# Patient Record
Sex: FEMALE | Race: WHITE | Employment: FULL TIME | URBAN - METROPOLITAN AREA
[De-identification: names, ages, dates, MRNs, and addresses within clinical notes are randomized per-mention and may not be internally consistent; named-entity substitution may affect disease eponyms.]

---

## 2017-03-10 ENCOUNTER — TRANSFERRED RECORDS (OUTPATIENT)
Dept: HEALTH INFORMATION MANAGEMENT | Facility: CLINIC | Age: 27
End: 2017-03-10

## 2017-08-22 ENCOUNTER — TRANSFERRED RECORDS (OUTPATIENT)
Dept: HEALTH INFORMATION MANAGEMENT | Facility: CLINIC | Age: 27
End: 2017-08-22

## 2017-09-21 ENCOUNTER — PRENATAL OFFICE VISIT (OUTPATIENT)
Dept: FAMILY MEDICINE | Facility: OTHER | Age: 27
End: 2017-09-21
Payer: COMMERCIAL

## 2017-09-21 ENCOUNTER — ALLIED HEALTH/NURSE VISIT (OUTPATIENT)
Dept: FAMILY MEDICINE | Facility: OTHER | Age: 27
End: 2017-09-21
Payer: COMMERCIAL

## 2017-09-21 VITALS — WEIGHT: 193.1 LBS | SYSTOLIC BLOOD PRESSURE: 110 MMHG | DIASTOLIC BLOOD PRESSURE: 70 MMHG | HEART RATE: 82 BPM

## 2017-09-21 VITALS — WEIGHT: 193 LBS | HEIGHT: 65 IN | BODY MASS INDEX: 32.15 KG/M2

## 2017-09-21 DIAGNOSIS — N91.2 ABSENCE OF MENSTRUATION: Primary | ICD-10-CM

## 2017-09-21 DIAGNOSIS — Z34.00 PREGNANCY, FIRST: Primary | ICD-10-CM

## 2017-09-21 LAB — BETA HCG QUAL IFA URINE: POSITIVE

## 2017-09-21 PROCEDURE — 84703 CHORIONIC GONADOTROPIN ASSAY: CPT | Performed by: FAMILY MEDICINE

## 2017-09-21 PROCEDURE — 99207 ZZC NO CHARGE NURSE ONLY: CPT

## 2017-09-21 RX ORDER — PRENATAL VIT/IRON FUM/FOLIC AC 27MG-0.8MG
1 TABLET ORAL DAILY
COMMUNITY

## 2017-09-21 NOTE — MR AVS SNAPSHOT
"              After Visit Summary   9/21/2017    Ann Cortes    MRN: [id number]           Patient Information     Date Of Birth          1990        Visit Information        Provider Department      9/21/2017 9:30 AM NL OB INTAKE [facility]        Today's Diagnoses     Pregnancy, first    -  1       Follow-ups after your visit        Your next 10 appointments already scheduled     Oct 02, 2017  4:10 PM CDT   New Prenatal with Dereck Conrad MD   [facility]    [address] [place] [id number] [phone]              Who to contact     If you have questions or need follow up information about today's clinic visit or your schedule please contact [facility] directly at [phone].  Normal or non-critical lab and imaging results will be communicated to you by MyChart, letter or phone within 4 business days after the clinic has received the results. If you do not hear from us within 7 days, please contact the clinic through MyChart or phone. If you have a critical or abnormal lab result, we will notify you by phone as soon as possible.  Submit refill requests through [email or web address] or call your pharmacy and they will forward the refill request to us. Please allow 3 business days for your refill to be completed.          Additional Information About Your Visit        Clarihart Information     [email or web address] lets you send messages to your doctor, view your test results, renew your prescriptions, schedule appointments and more. To sign up, go to [email or web address] . Click on \"Log in\" on the left side of the screen, which will take you to the Welcome page. Then click on \"Sign up Now\" on the right side of the page.     You will be asked to enter the access code listed below, as well as some personal information. Please follow the directions to create your username and password.     Your access code is: [email or web address]  Expires: " "2017  9:12 AM     Your access code will  in 90 days. If you need help or a new code, please call your [place] clinic or [phone].        Care EveryWhere ID     This is your Care EveryWhere ID. This could be used by other organizations to access your [place] medical records  [id number]        Your Vitals Were     Height Last Period BMI (Body Mass Index)             5' 5\" (1.651 m) 02/10/2017 (Exact Date) 32.12 kg/m2          Blood Pressure from Last 3 Encounters:   17 110/70    Weight from Last 3 Encounters:   17 193 lb (87.5 kg)   17 193 lb 1.6 oz (87.6 kg)              Today, you had the following     No orders found for display       Primary Care Provider    None Specified       No primary provider on file.        Equal Access to Services     JUNIOR RICCI : Hadii gio Vance, waangel reid, ramiro kaalmalinh farrar, burt rich . So [facility] [phone].    ATENCIÓN: Si habla español, tiene a manuel disposición servicios gratuitos de asistencia lingüística. Llame al [phone].    We comply with applicable federal civil rights laws and [place] laws. We do not discriminate on the basis of race, color, national origin, age, disability sex, sexual orientation or gender identity.            Thank you!     Thank you for choosing [facility]  for your care. Our goal is always to provide you with excellent care. Hearing back from our patients is one way we can continue to improve our services. Please take a few minutes to complete the written survey that you may receive in the mail after your visit with us. Thank you!             Your Updated Medication List - Protect others around you: Learn how to safely use, store and throw away your medicines at www.disposemymeds.org.          This list is accurate as of: 17 10:27 AM.  Always use your most recent med list.                   Brand Name Dispense Instructions for use " Diagnosis    NEXIUM PO      Take 20 mg by mouth every morning (before breakfast)        prenatal multivitamin plus iron 27-0.8 MG Tabs per tablet      Take 1 tablet by mouth daily

## 2017-09-21 NOTE — MR AVS SNAPSHOT
"              After Visit Summary   9/21/2017    Ann Cortes    MRN: [id number]           Patient Information     Date Of Birth          1990        Visit Information        Provider Department      9/21/2017 9:00 AM NL RN [facility]        Today's Diagnoses     Absence of menstruation    -  1       Follow-ups after your visit        Your next 10 appointments already scheduled     Sep 21, 2017  9:30 AM CDT   New Prenatal with NL OB INTAKE   [facility] ([facility])    [address] [facility] [id number] [phone]              Who to contact     If you have questions or need follow up information about today's clinic visit or your schedule please contact [facility] directly at [phone].  Normal or non-critical lab and imaging results will be communicated to you by Vatlerhart, letter or phone within 4 business days after the clinic has received the results. If you do not hear from us within 7 days, please contact the clinic through Vatlerhart or phone. If you have a critical or abnormal lab result, we will notify you by phone as soon as possible.  Submit refill requests through Heetch or call your pharmacy and they will forward the refill request to us. Please allow 3 business days for your refill to be completed.          Additional Information About Your Visit        Vatlerhart Information     Heetch lets you send messages to your doctor, view your test results, renew your prescriptions, schedule appointments and more. To sign up, go to [email or web address] . Click on \"Log in\" on the left side of the screen, which will take you to the Welcome page. Then click on \"Sign up Now\" on the right side of the page.     You will be asked to enter the access code listed below, as well as some personal information. Please follow the directions to create your username and password.     Your access code is: [email or web address]  Expires: 12/20/2017  9:12 AM   "   Your access code will  in 90 days. If you need help or a new code, please call your [place] clinic or [phone].        Care EveryWhere ID     This is your Care EveryWhere ID. This could be used by other organizations to access your [place] medical records  [id number]        Your Vitals Were     Pulse Last Period Breastfeeding?             82 02/10/2017 (Exact Date) No          Blood Pressure from Last 3 Encounters:   17 110/70    Weight from Last 3 Encounters:   17 193 lb 1.6 oz (87.6 kg)              We Performed the Following     Beta HCG qual IFA urine - [facility] [place]        Primary Care Provider    None Specified       No primary provider on file.        Equal Access to Services     JUNIOR RICCI : Hadcarmelo Vance, fred reid, ramiro farrar, burt rich . So [facility] [phone].    ATENCIÓN: Si habla español, tiene a manuel disposición servicios gratuitos de asistencia lingüística. Llame al [phone].    We comply with applicable federal civil rights laws and [place] laws. We do not discriminate on the basis of race, color, national origin, age, disability sex, sexual orientation or gender identity.            Thank you!     Thank you for choosing [facility]  for your care. Our goal is always to provide you with excellent care. Hearing back from our patients is one way we can continue to improve our services. Please take a few minutes to complete the written survey that you may receive in the mail after your visit with us. Thank you!             Your Updated Medication List - Protect others around you: Learn how to safely use, store and throw away your medicines at www.disposemymeds.org.          This list is accurate as of: 17  9:12 AM.  Always use your most recent med list.                   Brand Name Dispense Instructions for use Diagnosis    NEXIUM PO      Take 20 mg by mouth every morning (before  breakfast)        prenatal multivitamin plus iron 27-0.8 MG Tabs per tablet      Take 1 tablet by mouth daily

## 2017-09-21 NOTE — NURSING NOTE
"Ann is here with , Zachary.  She is   (possibly previous chemical pregnancy).  EDC 2017 based on LMP and confirmed by ultrasound.   They just moved here from [place] and are transferring care to Dr. Conrad for [place] [facility] at 32 weeks.  (see CareEverywhere)  She reports no problems so far this pregnancy.  Admits to occasional non-painful contractions \"every few days and they don't last long\".  Reviewed PTL warning sign/sx and contact info for [facility].  Last appointment. In [place] was on .  OB follow-up appointment. Scheduled with Dr. Conrad  on 10/2  (his first available that fits patient's schedule), to establish care.       She is a  at [employer].  Zachary works in agriculture.  They're living, temporarily, with her parents near [place].      OB folder given.  They attended childbirth classes in [place].  Encouraged [facility] tour and to call with questions or concerns.    Alma Delia Welch R.N.    "

## 2017-09-21 NOTE — PROGRESS NOTES
Ann Cortes is a 27 year old here today for a pregnancy test.  LMP: Patient's last menstrual period was 02/10/2017 (exact date).  Wt: 193 lbs 1.6 oz.    Symptoms include absence of menses.    Ann informed of positive pregnancy test results. RAH: 2017    Educational advice given: nutrition, smoking and drugs & alcohol.    Current medications reviewed: Yes    Previous pregnancy history remarkable for: None.    Plan: schedule appointment with OB Educator and/or OB class, follow-up appointment with Nikki Warner for pre- care, take multivitamin or pre- vitamins and OB Education packet given.    She is to call back if she has any questions or concerns.  She is advised to notify a provider immediately if she experiences any severe cramping or abdominal pain or any vaginal bleeding.    Patient is new the area. She moved here from [place] and needs to establish care within [place]. She is scheduled to see Alma Delia today.     Mercedes Taylor RN  [facility]

## 2017-09-21 NOTE — PROGRESS NOTES
1. Have you had chicken pox?   Yes    Genetic Screening    Has the patient, baby's father, or anyone in either family had:     1. Thalassemia and and MCV result less than 80?No   2.  Neural tube defect? No   3.  Congenital heart defect?No   4. Down's syndrome?No   5.  Colby-Sachs disease?No   6. Sickle Cell disease or trait?No   7. Hemophilia or other inherited problems of blood?No   8. Muscular dystropy?No   9.  Cystic fibrosis?No  10. Equality's Chorea?No  11. Mental Retardation or autism?  No         If yes, was the person tested for Fragile X?   12. Any other inherited genetic or chromosomal disorders?No  13. Metabolic disorders such as diabetes or PKU?No  14. A child born with defects not listed above?No  15. Recurrent pregnancy loss or stillbirth?No  16.  Has the patient had any medications/street drugs/alcohol since her last menstrual period?No  18.  Does the patient or baby's father have any other genetic risks. No

## 2017-10-02 ENCOUNTER — PRENATAL OFFICE VISIT (OUTPATIENT)
Dept: FAMILY MEDICINE | Facility: CLINIC | Age: 27
End: 2017-10-02
Payer: COMMERCIAL

## 2017-10-02 ENCOUNTER — TELEPHONE (OUTPATIENT)
Dept: FAMILY MEDICINE | Facility: CLINIC | Age: 27
End: 2017-10-02

## 2017-10-02 VITALS
HEART RATE: 95 BPM | SYSTOLIC BLOOD PRESSURE: 112 MMHG | OXYGEN SATURATION: 98 % | DIASTOLIC BLOOD PRESSURE: 64 MMHG | BODY MASS INDEX: 32.78 KG/M2 | TEMPERATURE: 97.1 F | WEIGHT: 197 LBS

## 2017-10-02 DIAGNOSIS — Z34.03 ENCOUNTER FOR SUPERVISION OF NORMAL FIRST PREGNANCY IN THIRD TRIMESTER: Primary | ICD-10-CM

## 2017-10-02 PROBLEM — Z34.00 SUPERVISION OF NORMAL FIRST PREGNANCY: Status: ACTIVE | Noted: 2017-10-02

## 2017-10-02 PROCEDURE — 99212 OFFICE O/P EST SF 10 MIN: CPT | Performed by: FAMILY MEDICINE

## 2017-10-02 ASSESSMENT — PAIN SCALES - GENERAL: PAINLEVEL: MILD PAIN (2)

## 2017-10-02 NOTE — NURSING NOTE
"Chief Complaint   Patient presents with     Prenatal Care       Initial /64 (Cuff Size: Adult Regular)  Pulse 95  Temp 97.1  F (36.2  C) (Temporal)  Wt 197 lb (89.4 kg)  LMP 02/10/2017 (Exact Date)  SpO2 98%  BMI 32.78 kg/m2 Estimated body mass index is 32.78 kg/(m^2) as calculated from the following:    Height as of 9/21/17: 5' 5\" (1.651 m).    Weight as of this encounter: 197 lb (89.4 kg).  Medication Reconciliation: complete   Francesca King CMA (AAMA)      "

## 2017-10-02 NOTE — MR AVS SNAPSHOT
"              After Visit Summary   10/2/2017    Ann Cortes    MRN: [id number]           Patient Information     Date Of Birth          1990        Visit Information        Provider Department      10/2/2017 4:10 PM Dereck Conrad MD [facility]        Today's Diagnoses     Encounter for supervision of normal first pregnancy in third trimester    -  1       Follow-ups after your visit        Who to contact     If you have questions or need follow up information about today's clinic visit or your schedule please contact [facility] directly at [phone].  Normal or non-critical lab and imaging results will be communicated to you by Tabberhart, letter or phone within 4 business days after the clinic has received the results. If you do not hear from us within 7 days, please contact the clinic through Genprext or phone. If you have a critical or abnormal lab result, we will notify you by phone as soon as possible.  Submit refill requests through NowForce or call your pharmacy and they will forward the refill request to us. Please allow 3 business days for your refill to be completed.          Additional Information About Your Visit        MyChart Information     NowForce lets you send messages to your doctor, view your test results, renew your prescriptions, schedule appointments and more. To sign up, go to [email or web address] . Click on \"Log in\" on the left side of the screen, which will take you to the Welcome page. Then click on \"Sign up Now\" on the right side of the page.     You will be asked to enter the access code listed below, as well as some personal information. Please follow the directions to create your username and password.     Your access code is: [email or web address]  Expires: 2017  9:12 AM     Your access code will  in 90 days. If you need help or a new code, please call your [facility] or [phone].        Care EveryWhere ID     This is your " Care EveryWhere ID. This could be used by other organizations to access your [place] medical records  [id number]        Your Vitals Were     Pulse Temperature Last Period Pulse Oximetry BMI (Body Mass Index)       95 97.1  F (36.2  C) (Temporal) 02/10/2017 (Exact Date) 98% 32.78 kg/m2        Blood Pressure from Last 3 Encounters:   10/02/17 112/64   09/21/17 110/70    Weight from Last 3 Encounters:   10/02/17 197 lb (89.4 kg)   09/21/17 193 lb (87.5 kg)   09/21/17 193 lb 1.6 oz (87.6 kg)              Today, you had the following     No orders found for display       Primary Care Provider    None Specified       No primary provider on file.        Equal Access to Services     JUNIOR RICCI : Luis Carlos Vance, waangel reid, qaindira kaalmada charan, burt rich . So [facility] [phone].    ATENCIÓN: Si habla español, tiene a manuel disposición servicios gratuitos de asistencia lingüística. Llame al [phone].    We comply with applicable federal civil rights laws and [place] laws. We do not discriminate on the basis of race, color, national origin, age, disability, sex, sexual orientation, or gender identity.            Thank you!     Thank you for choosing [facility]  for your care. Our goal is always to provide you with excellent care. Hearing back from our patients is one way we can continue to improve our services. Please take a few minutes to complete the written survey that you may receive in the mail after your visit with us. Thank you!             Your Updated Medication List - Protect others around you: Learn how to safely use, store and throw away your medicines at www.disposemymeds.org.          This list is accurate as of: 10/2/17  4:34 PM.  Always use your most recent med list.                   Brand Name Dispense Instructions for use Diagnosis    NEXIUM PO      Take 20 mg by mouth every morning (before breakfast)        prenatal multivitamin  plus iron 27-0.8 MG Tabs per tablet      Take 1 tablet by mouth daily

## 2017-10-16 ENCOUNTER — PRENATAL OFFICE VISIT (OUTPATIENT)
Dept: FAMILY MEDICINE | Facility: CLINIC | Age: 27
End: 2017-10-16
Payer: COMMERCIAL

## 2017-10-16 VITALS
WEIGHT: 200.38 LBS | TEMPERATURE: 98.8 F | BODY MASS INDEX: 33.34 KG/M2 | RESPIRATION RATE: 12 BRPM | OXYGEN SATURATION: 98 % | SYSTOLIC BLOOD PRESSURE: 124 MMHG | HEART RATE: 105 BPM | DIASTOLIC BLOOD PRESSURE: 70 MMHG

## 2017-10-16 DIAGNOSIS — Z34.03 ENCOUNTER FOR SUPERVISION OF NORMAL FIRST PREGNANCY IN THIRD TRIMESTER: Primary | ICD-10-CM

## 2017-10-16 PROCEDURE — 99207 ZZC PRENATAL VISIT: CPT | Performed by: FAMILY MEDICINE

## 2017-10-16 ASSESSMENT — PAIN SCALES - GENERAL: PAINLEVEL: NO PAIN (0)

## 2017-10-16 NOTE — NURSING NOTE
"Chief Complaint   Patient presents with     Prenatal Care     35w3d       Initial /70 (BP Location: Right arm, Patient Position: Chair, Cuff Size: Adult Regular)  Pulse 105  Temp 98.8  F (37.1  C) (Tympanic)  Resp 12  Wt 200 lb 6 oz (90.9 kg)  LMP 02/10/2017 (Exact Date)  SpO2 98%  BMI 33.34 kg/m2 Estimated body mass index is 33.34 kg/(m^2) as calculated from the following:    Height as of 9/21/17: 5' 5\" (1.651 m).    Weight as of this encounter: 200 lb 6 oz (90.9 kg).  Medication Reconciliation: complete   Health Maintenance Due   Topic Date Due     MATERNAL SCREENING  05/26/2017     OBGCT (OB)  07/28/2017     REPEAT ANTIBODY SCREEN (OB)  08/25/2017     RH IMMUNE GLOBULIN (OB)  08/25/2017     INFLUENZA VACCINE (SYSTEM ASSIGNED)  09/01/2017     GROUP B STREP SCREENING  10/20/2017     Orquidea Faye, [facility]      "

## 2017-10-16 NOTE — MR AVS SNAPSHOT
After Visit Summary   10/16/2017    Ann Cortes    MRN: [id number]           Patient Information     Date Of Birth          1990        Visit Information        Provider Department      10/16/2017 3:50 PM Dereck Conrad MD [facility]        Today's Diagnoses     Encounter for supervision of normal first pregnancy in third trimester    -  1       Follow-ups after your visit        Your next 10 appointments already scheduled     Oct 23, 2017  3:50 PM CDT   ESTABLISHED PRENATAL with Dereck Conrad MD   [facility] ([address] [id number] [phone]            Nov 06, 2017  3:50 PM CST   ESTABLISHED PRENATAL with Dereck Conrad MD   [facility] ([address] [phone]            Nov 13, 2017  4:00 PM CST   ESTABLISHED PRENATAL with Darwin Rosen MD   [facility] ([address] [phone] [phone]            Nov 20, 2017  3:50 PM CST   ESTABLISHED PRENATAL with Dereck Conrad MD   [facility] ([address] [phone] [phone]              Who to contact     If you have questions or need follow up information about today's clinic visit or your schedule please contact [facility] directly at [phone].  Normal or non-critical lab and imaging results will be communicated to you by MyChart, letter or phone within 4 business days after the clinic has received the results. If you do not hear from us within 7 days, please contact the clinic through eCullethart or phone. If you have a critical or abnormal lab result, we will notify you by phone as soon as possible.  Submit refill requests through NanoHorizons or call your pharmacy and they will forward the refill  "request to us. Please allow 3 business days for your refill to be completed.          Additional Information About Your Visit        Billiboxhart Information     [employer] lets you send messages to your doctor, view your test results, renew your prescriptions, schedule appointments and more. To sign up, go to [email or web address] . Click on \"Log in\" on the left side of the screen, which will take you to the Welcome page. Then click on \"Sign up Now\" on the right side of the page.     You will be asked to enter the access code listed below, as well as some personal information. Please follow the directions to create your username and password.     Your access code is: [email or web address]  Expires: 2017  9:12 AM     Your access code will  in 90 days. If you need help or a new code, please call your [place] clinic or [phone].        Care EveryWhere ID     This is your Care EveryWhere ID. This could be used by other organizations to access your [place] medical records  [id number]        Your Vitals Were     Pulse Temperature Respirations Last Period Pulse Oximetry BMI (Body Mass Index)    105 98.8  F (37.1  C) (Tympanic) 12 02/10/2017 (Exact Date) 98% 33.34 kg/m2       Blood Pressure from Last 3 Encounters:   10/16/17 124/70   10/02/17 112/64   17 110/70    Weight from Last 3 Encounters:   10/16/17 200 lb 6 oz (90.9 kg)   10/02/17 197 lb (89.4 kg)   17 193 lb (87.5 kg)              Today, you had the following     No orders found for display       Primary Care Provider    None Specified       No primary provider on file.        Equal Access to Services     [facility] : Hadii gio Vance, fred reid, burt marcelino . So [facility] [phone].    ATENCIÓN: Si habla español, tiene a manuel disposición servicios gratuitos de asistencia lingüística. Llame al [phone].    We comply with applicable federal civil rights laws and [place] " laws. We do not discriminate on the basis of race, color, national origin, age, disability, sex, sexual orientation, or gender identity.            Thank you!     Thank you for choosing [facility]  for your care. Our goal is always to provide you with excellent care. Hearing back from our patients is one way we can continue to improve our services. Please take a few minutes to complete the written survey that you may receive in the mail after your visit with us. Thank you!             Your Updated Medication List - Protect others around you: Learn how to safely use, store and throw away your medicines at www.disposemymeds.org.          This list is accurate as of: 10/16/17  7:31 PM.  Always use your most recent med list.                   Brand Name Dispense Instructions for use Diagnosis    NEXIUM PO      Take 20 mg by mouth every morning (before breakfast)        prenatal multivitamin plus iron 27-0.8 MG Tabs per tablet      Take 1 tablet by mouth daily

## 2017-10-23 ENCOUNTER — PRENATAL OFFICE VISIT (OUTPATIENT)
Dept: FAMILY MEDICINE | Facility: CLINIC | Age: 27
End: 2017-10-23
Payer: COMMERCIAL

## 2017-10-23 VITALS
DIASTOLIC BLOOD PRESSURE: 66 MMHG | SYSTOLIC BLOOD PRESSURE: 128 MMHG | WEIGHT: 199.5 LBS | HEART RATE: 106 BPM | RESPIRATION RATE: 16 BRPM | BODY MASS INDEX: 33.2 KG/M2 | TEMPERATURE: 96.9 F | OXYGEN SATURATION: 100 %

## 2017-10-23 DIAGNOSIS — Z34.03 ENCOUNTER FOR SUPERVISION OF NORMAL FIRST PREGNANCY IN THIRD TRIMESTER: Primary | ICD-10-CM

## 2017-10-23 PROBLEM — Z34.00 SUPERVISION OF NORMAL FIRST PREGNANCY: Status: RESOLVED | Noted: 2017-10-02 | Resolved: 2017-10-23

## 2017-10-23 PROCEDURE — 87653 STREP B DNA AMP PROBE: CPT | Performed by: FAMILY MEDICINE

## 2017-10-23 PROCEDURE — 99207 ZZC PRENATAL VISIT: CPT | Performed by: FAMILY MEDICINE

## 2017-10-23 PROCEDURE — 87186 SC STD MICRODIL/AGAR DIL: CPT | Performed by: FAMILY MEDICINE

## 2017-10-23 ASSESSMENT — PAIN SCALES - GENERAL: PAINLEVEL: NO PAIN (0)

## 2017-10-23 NOTE — NURSING NOTE
"Chief Complaint   Patient presents with     Prenatal Care     36w3d       Initial /66 (BP Location: Right arm, Patient Position: Chair, Cuff Size: Adult Regular)  Pulse 106  Temp 96.9  F (36.1  C) (Tympanic)  Resp 16  Wt 199 lb 8 oz (90.5 kg)  LMP 02/10/2017 (Exact Date)  SpO2 100%  BMI 33.2 kg/m2 Estimated body mass index is 33.2 kg/(m^2) as calculated from the following:    Height as of 9/21/17: 5' 5\" (1.651 m).    Weight as of this encounter: 199 lb 8 oz (90.5 kg).  Medication Reconciliation: complete   Health Maintenance Due   Topic Date Due     MATERNAL SCREENING  05/26/2017     OBGCT (OB)  07/28/2017     REPEAT ANTIBODY SCREEN (OB)  08/25/2017     RH IMMUNE GLOBULIN (OB)  08/25/2017     GROUP B STREP SCREENING  10/20/2017     Orquidea Faye, [facility]      "

## 2017-10-23 NOTE — MR AVS SNAPSHOT
After Visit Summary   10/23/2017    Ann Cortes    MRN: [id number]           Patient Information     Date Of Birth          1990        Visit Information        Provider Department      10/23/2017 3:50 PM Dereck Conrad MD [facility]        Today's Diagnoses     Encounter for supervision of normal first pregnancy in third trimester    -  1       Follow-ups after your visit        Your next 10 appointments already scheduled     Nov 03, 2017  3:40 PM CDT   ESTABLISHED PRENATAL with Evi Oconnor Mai, MD   [address] [address] [phone]            Nov 06, 2017  3:50 PM CST   ESTABLISHED PRENATAL with Dereck Conrad MD   [facility] ([address] [address] [phone]            Nov 13, 2017  4:00 PM CST   ESTABLISHED PRENATAL with Darwin Rosen MD   [facility] ([address] [phone] [phone]            Nov 20, 2017  3:50 PM CST   ESTABLISHED PRENATAL with Dereck Conrad MD   [facility] ([address] [phone] [phone]              Who to contact     If you have questions or need follow up information about today's clinic visit or your schedule please contact [facility] directly at [phone].  Normal or non-critical lab and imaging results will be communicated to you by MyChart, letter or phone within 4 business days after the clinic has received the results. If you do not hear from us within 7 days, please contact the clinic through [employer] or phone. If you have a critical or abnormal lab result, we will notify you by phone as soon as possible.  Submit refill requests through [employer] or call your pharmacy and they will forward the refill request  "to us. Please allow 3 business days for your refill to be completed.          Additional Information About Your Visit        MyChart Information     [employer] lets you send messages to your doctor, view your test results, renew your prescriptions, schedule appointments and more. To sign up, go to [email or web address] . Click on \"Log in\" on the left side of the screen, which will take you to the Welcome page. Then click on \"Sign up Now\" on the right side of the page.     You will be asked to enter the access code listed below, as well as some personal information. Please follow the directions to create your username and password.     Your access code is: [email or web address]  Expires: 2017  9:12 AM     Your access code will  in 90 days. If you need help or a new code, please call your [facility] clinic or [phone].        Care EveryWhere ID     This is your [facility] EveryWhere ID. This could be used by other organizations to access your [place] medical records  [id number]        Your Vitals Were     Pulse Temperature Respirations Last Period Pulse Oximetry BMI (Body Mass Index)    106 96.9  F (36.1  C) (Tympanic) 16 02/10/2017 (Exact Date) 100% 33.2 kg/m2       Blood Pressure from Last 3 Encounters:   10/23/17 128/66   10/16/17 124/70   10/02/17 112/64    Weight from Last 3 Encounters:   10/23/17 199 lb 8 oz (90.5 kg)   10/16/17 200 lb 6 oz (90.9 kg)   10/02/17 197 lb (89.4 kg)              We Performed the Following     Strep, Group B by PCR        Primary Care Provider Office Phone # Fax #    Dereck Conrad [phone] [address] [place] [address]        Equal Access to Services     [facility] RADHAMES : Luis Carlos Vance, waangel reid, qaybta miryamalvirginia farrar, burt churchill. So [facility] [phone].    ATENCIÓN: Si habla español, tiene a manuel disposición servicios gratuitos de asistencia lingüística. Llame al [phone].    We comply with " applicable federal civil rights laws and [place] laws. We do not discriminate on the basis of race, color, national origin, age, disability, sex, sexual orientation, or gender identity.            Thank you!     Thank you for choosing [facility]  for your care. Our goal is always to provide you with excellent care. Hearing back from our patients is one way we can continue to improve our services. Please take a few minutes to complete the written survey that you may receive in the mail after your visit with us. Thank you!             Your Updated Medication List - Protect others around you: Learn how to safely use, store and throw away your medicines at www.disposemymeds.org.          This list is accurate as of: 10/23/17  4:16 PM.  Always use your most recent med list.                   Brand Name Dispense Instructions for use Diagnosis    NEXIUM PO      Take 20 mg by mouth every morning (before breakfast)        prenatal multivitamin plus iron 27-0.8 MG Tabs per tablet      Take 1 tablet by mouth daily

## 2017-10-23 NOTE — PROGRESS NOTES
Ann Cortes will report to OB if ctx's. Q 5-10 min.or if spont. rupture of membranes.  She will monitor fetal activity counts as directed.  Please call if persistant H/A, blurred vision, persistant swelling or continuous contraction  Will see Dr. Roe in my absence next wek

## 2017-10-24 LAB
GP B STREP DNA SPEC QL NAA+PROBE: POSITIVE
SPECIMEN SOURCE: ABNORMAL

## 2017-10-27 LAB
BACTERIA SPEC CULT: ABNORMAL
SPECIMEN SOURCE: ABNORMAL

## 2017-11-03 ENCOUNTER — PRENATAL OFFICE VISIT (OUTPATIENT)
Dept: FAMILY MEDICINE | Facility: CLINIC | Age: 27
End: 2017-11-03
Payer: COMMERCIAL

## 2017-11-03 VITALS
SYSTOLIC BLOOD PRESSURE: 120 MMHG | WEIGHT: 201 LBS | RESPIRATION RATE: 16 BRPM | BODY MASS INDEX: 33.49 KG/M2 | DIASTOLIC BLOOD PRESSURE: 60 MMHG | HEART RATE: 88 BPM | HEIGHT: 65 IN

## 2017-11-03 DIAGNOSIS — Z34.93 PRENATAL CARE IN THIRD TRIMESTER: Primary | ICD-10-CM

## 2017-11-03 PROCEDURE — 99207 ZZC PRENATAL VISIT: CPT | Performed by: FAMILY MEDICINE

## 2017-11-03 ASSESSMENT — PAIN SCALES - GENERAL: PAINLEVEL: MILD PAIN (3)

## 2017-11-03 NOTE — NURSING NOTE
"Chief Complaint   Patient presents with     Prenatal Care     38w0d       Initial /60  Pulse 88  Resp 16  Ht 5' 5\" (1.651 m)  Wt 201 lb (91.2 kg)  LMP 02/10/2017 (Exact Date)  BMI 33.45 kg/m2 Estimated body mass index is 33.45 kg/(m^2) as calculated from the following:    Height as of this encounter: 5' 5\" (1.651 m).    Weight as of this encounter: 201 lb (91.2 kg).  Medication Reconciliation: complete   Madison Martinez CMA (AAMA)   "

## 2017-11-03 NOTE — MR AVS SNAPSHOT
After Visit Summary   11/3/2017    Ann Cortes    MRN: [id number]           Patient Information     Date Of Birth          1990        Visit Information        Provider Department      11/3/2017 3:40 PM Evi Roe MD [facility]        Today's Diagnoses     Prenatal care in third trimester    -  1       Follow-ups after your visit        Follow-up notes from your care team     Return in about 1 week (around 11/10/2017).      Your next 10 appointments already scheduled     Nov 06, 2017  3:50 PM CST   ESTABLISHED PRENATAL with Dereck Conrad MD   [address] [address] [phone]            Nov 13, 2017  4:00 PM CST   ESTABLISHED PRENATAL with Darwin Rosen MD   [facility] ([address] [phone]            Nov 20, 2017  3:50 PM CST   ESTABLISHED PRENATAL with Dereck Conrad MD   [facility] ([address] [phone] [phone]              Who to contact     If you have questions or need follow up information about today's clinic visit or your schedule please contact [facility] directly at [phone].  Normal or non-critical lab and imaging results will be communicated to you by MyChart, letter or phone within 4 business days after the clinic has received the results. If you do not hear from us within 7 days, please contact the clinic through MyChart or phone. If you have a critical or abnormal lab result, we will notify you by phone as soon as possible.  Submit refill requests through [employer] or call your pharmacy and they will forward the refill request to us. Please allow 3 business days for your refill to be completed.          Additional Information About Your Visit        MyChart Information     [facility]  "gives you secure access to your electronic health record. If you see a primary care provider, you can also send messages to your care team and make appointments. If you have questions, please call your primary care clinic.  If you do not have a primary care provider, please call [phone] and they will assist you.        Care EveryWhere ID     This is your Care EveryWhere ID. This could be used by other organizations to access your [facility] medical records  [id number]        Your Vitals Were     Pulse Respirations Height Last Period BMI (Body Mass Index)       88 16 5' 5\" (1.651 m) 02/10/2017 (Exact Date) 33.45 kg/m2        Blood Pressure from Last 3 Encounters:   11/03/17 120/60   10/23/17 128/66   10/16/17 124/70    Weight from Last 3 Encounters:   11/03/17 201 lb (91.2 kg)   10/23/17 199 lb 8 oz (90.5 kg)   10/16/17 200 lb 6 oz (90.9 kg)              Today, you had the following     No orders found for display       Primary Care Provider Office Phone # Fax #    Dereck Conrad [phone] [address] [place] [address]        Equal Access to Services     JUNIOR RICCI AH: Hadii aad ku hadasho Soomaali, waaxda luqadaha, qaybta kaalmada adeegyada, burt camejo hayivann franklin smith lamaryan churchill. So [facility] [phone].    ATENCIÓN: Si habla español, tiene a manuel disposición servicios gratuitos de asistencia lingüística. Llame al [phone].    We comply with applicable federal civil rights laws and [place] laws. We do not discriminate on the basis of race, color, national origin, age, disability, sex, sexual orientation, or gender identity.            Thank you!     Thank you for choosing [facility]  for your care. Our goal is always to provide you with excellent care. Hearing back from our patients is one way we can continue to improve our services. Please take a few minutes to complete the written survey that you may receive in the mail after your visit with us. Thank " you!             Your Updated Medication List - Protect others around you: Learn how to safely use, store and throw away your medicines at www.disposemymeds.org.          This list is accurate as of: 11/3/17 11:59 PM.  Always use your most recent med list.                   Brand Name Dispense Instructions for use Diagnosis    NEXIUM PO      Take 20 mg by mouth every morning (before breakfast)        prenatal multivitamin plus iron 27-0.8 MG Tabs per tablet      Take 1 tablet by mouth daily

## 2017-11-05 NOTE — PROGRESS NOTES
Chart reviewed.  Doing well and she has no concern.  Normal fetal movement with sporadic non-painful contractions.  No headache or dizziness. No acute change in her vision.  No cramping or abnormal discharge.  No vaginal bleeding and denies of leakage.  No UTI symptoms.  No legs swelling bilaterally.       Reviewed the prenatal flow sheet with her    Discussed bout intrapartum care; desirous for epidural. GBS negative. Denies of depression and feels safe at home.      Overall she is doing well.  Continue with prenatal vitamin daily and drinks a lot of water.  Labor symptoms discussed and educate her about symptoms that need to call in or be seen.  Follow up in 1 week with Dr. Conrad, earlier as needed.  All of her questions were answered.

## 2017-11-06 ENCOUNTER — PRENATAL OFFICE VISIT (OUTPATIENT)
Dept: FAMILY MEDICINE | Facility: CLINIC | Age: 27
End: 2017-11-06
Payer: COMMERCIAL

## 2017-11-06 VITALS
BODY MASS INDEX: 33.7 KG/M2 | OXYGEN SATURATION: 99 % | SYSTOLIC BLOOD PRESSURE: 120 MMHG | HEART RATE: 92 BPM | RESPIRATION RATE: 14 BRPM | WEIGHT: 202.5 LBS | TEMPERATURE: 97.3 F | DIASTOLIC BLOOD PRESSURE: 76 MMHG

## 2017-11-06 DIAGNOSIS — Z34.03 ENCOUNTER FOR SUPERVISION OF NORMAL FIRST PREGNANCY IN THIRD TRIMESTER: Primary | ICD-10-CM

## 2017-11-06 PROCEDURE — 99207 ZZC PRENATAL VISIT: CPT | Performed by: FAMILY MEDICINE

## 2017-11-06 ASSESSMENT — PAIN SCALES - GENERAL: PAINLEVEL: MILD PAIN (3)

## 2017-11-06 NOTE — MR AVS SNAPSHOT
After Visit Summary   11/6/2017    Ann Cortes    MRN: [id number]           Patient Information     Date Of Birth          1990        Visit Information        Provider Department      11/6/2017 3:50 PM Dereck Conrad MD [facility]        Today's Diagnoses     Encounter for supervision of normal first pregnancy in third trimester    -  1       Follow-ups after your visit        Your next 10 appointments already scheduled     Nov 13, 2017  4:00 PM CST   ESTABLISHED PRENATAL with Darwin Rosen MD   [facility] ([address] [phone]            Nov 20, 2017  3:50 PM CST   ESTABLISHED PRENATAL with Dereck Conrad MD   [facility] ([address] [address] [phone]              Who to contact     If you have questions or need follow up information about today's clinic visit or your schedule please contact [facility] directly at [phone].  Normal or non-critical lab and imaging results will be communicated to you by MyChart, letter or phone within 4 business days after the clinic has received the results. If you do not hear from us within 7 days, please contact the clinic through [employer] or phone. If you have a critical or abnormal lab result, we will notify you by phone as soon as possible.  Submit refill requests through [employer] or call your pharmacy and they will forward the refill request to us. Please allow 3 business days for your refill to be completed.          Additional Information About Your Visit        MyChart Information     [employer] gives you secure access to your electronic health record. If you see a primary care provider, you can also send messages to your care team and make appointments. If you have questions, please call your primary care clinic.  If you do not have a primary  care provider, please call [phone] and they will assist you.        Care EveryWhere ID     This is your Care EveryWhere ID. This could be used by other organizations to access your [facility] medical records  [id number]        Your Vitals Were     Pulse Temperature Respirations Last Period Pulse Oximetry BMI (Body Mass Index)    92 97.3  F (36.3  C) (Tympanic) 14 02/10/2017 (Exact Date) 99% 33.7 kg/m2       Blood Pressure from Last 3 Encounters:   11/06/17 120/76   11/03/17 120/60   10/23/17 128/66    Weight from Last 3 Encounters:   11/06/17 202 lb 8 oz (91.9 kg)   11/03/17 201 lb (91.2 kg)   10/23/17 199 lb 8 oz (90.5 kg)              Today, you had the following     No orders found for display       Primary Care Provider Office Phone # Fax #    Dereck Conrad [phone] [address] [place] [address]        Equal Access to Services     [facility]: Hadii aad ku hadasho Soomaali, waaxda luqadaha, qaybta kaalmada adeegyada, burt rich . So [facility] [phone].    ATENCIÓN: Si habla español, tiene a manuel disposición servicios gratuitos de asistencia lingüística. Llame al [phone].    We comply with applicable federal civil rights laws and [place] laws. We do not discriminate on the basis of race, color, national origin, age, disability, sex, sexual orientation, or gender identity.            Thank you!     Thank you for choosing [facility]  for your care. Our goal is always to provide you with excellent care. Hearing back from our patients is one way we can continue to improve our services. Please take a few minutes to complete the written survey that you may receive in the mail after your visit with us. Thank you!             Your Updated Medication List - Protect others around you: Learn how to safely use, store and throw away your medicines at www.disposemymeds.org.          This list is accurate as of: 11/6/17  7:01 PM.   Always use your most recent med list.                   Brand Name Dispense Instructions for use Diagnosis    NEXIUM PO      Take 20 mg by mouth every morning (before breakfast)        prenatal multivitamin plus iron 27-0.8 MG Tabs per tablet      Take 1 tablet by mouth daily

## 2017-11-06 NOTE — PROGRESS NOTES
Ann Cortes will report to OB if ctx's. Q 5-10 min.or if spont. rupture of membranes.  She will monitor fetal activity counts as directed.  Please call if persistant H/A, blurred vision, persistant swelling or continuous contraction

## 2017-11-10 ENCOUNTER — MYC MEDICAL ADVICE (OUTPATIENT)
Dept: FAMILY MEDICINE | Facility: CLINIC | Age: 27
End: 2017-11-10

## 2017-11-13 ENCOUNTER — PRENATAL OFFICE VISIT (OUTPATIENT)
Dept: FAMILY MEDICINE | Facility: CLINIC | Age: 27
End: 2017-11-13
Payer: COMMERCIAL

## 2017-11-13 VITALS
WEIGHT: 202 LBS | RESPIRATION RATE: 12 BRPM | DIASTOLIC BLOOD PRESSURE: 86 MMHG | OXYGEN SATURATION: 97 % | HEART RATE: 92 BPM | TEMPERATURE: 98 F | BODY MASS INDEX: 33.61 KG/M2 | SYSTOLIC BLOOD PRESSURE: 130 MMHG

## 2017-11-13 DIAGNOSIS — Z34.03 ENCOUNTER FOR SUPERVISION OF NORMAL FIRST PREGNANCY IN THIRD TRIMESTER: Primary | ICD-10-CM

## 2017-11-13 PROCEDURE — 99207 ZZC PRENATAL VISIT: CPT | Performed by: FAMILY MEDICINE

## 2017-11-13 ASSESSMENT — PAIN SCALES - GENERAL: PAINLEVEL: NO PAIN (0)

## 2017-11-13 NOTE — MR AVS SNAPSHOT
After Visit Summary   11/13/2017    Ann Cortes    MRN: [id number]           Patient Information     Date Of Birth          1990        Visit Information        Provider Department      11/13/2017 4:00 PM Darwin Rosen MD [facility]        Today's Diagnoses     Encounter for supervision of normal first pregnancy in third trimester    -  1       Follow-ups after your visit        Your next 10 appointments already scheduled     Nov 20, 2017  3:50 PM CST   ESTABLISHED PRENATAL with Dereck Conrad MD   [facility] ([facility])    [address] [place] [phone]              Who to contact     If you have questions or need follow up information about today's clinic visit or your schedule please contact [facility] directly at [phone].  Normal or non-critical lab and imaging results will be communicated to you by MyChart, letter or phone within 4 business days after the clinic has received the results. If you do not hear from us within 7 days, please contact the clinic through MyChart or phone. If you have a critical or abnormal lab result, we will notify you by phone as soon as possible.  Submit refill requests through [employer] or call your pharmacy and they will forward the refill request to us. Please allow 3 business days for your refill to be completed.          Additional Information About Your Visit        MyChart Information     [employer] gives you secure access to your electronic health record. If you see a primary care provider, you can also send messages to your care team and make appointments. If you have questions, please call your primary care clinic.  If you do not have a primary care provider, please call [phone] and they will assist you.        Care EveryWhere ID     This is your Care EveryWhere ID. This could be used by other organizations to access your [facility]  records  [id number]        Your Vitals Were     Pulse Temperature Respirations Last Period Pulse Oximetry BMI (Body Mass Index)    92 98  F (36.7  C) (Tympanic) 12 02/10/2017 (Exact Date) 97% 33.61 kg/m2       Blood Pressure from Last 3 Encounters:   11/13/17 130/86   11/06/17 120/76   11/03/17 120/60    Weight from Last 3 Encounters:   11/13/17 202 lb (91.6 kg)   11/06/17 202 lb 8 oz (91.9 kg)   11/03/17 201 lb (91.2 kg)              Today, you had the following     No orders found for display       Primary Care Provider Office Phone # Fax #    Dereck Conrad [phone] [address] [place] [address]        Equal Access to Services     [facility] : Hadii aad ku hadasho Soomaali, waaxda luqadaha, qaybta kaalmada adelatoyayalinh, burt rich . So [facility] [phone].    ATENCIÓN: Si habla español, tiene a manuel disposición servicios gratuitos de asistencia lingüística. LlMercy Health St. [facility] [phone].    We comply with applicable federal civil rights laws and [place] laws. We do not discriminate on the basis of race, color, national origin, age, disability, sex, sexual orientation, or gender identity.            Thank you!     Thank you for choosing [facility]  for your care. Our goal is always to provide you with excellent care. Hearing back from our patients is one way we can continue to improve our services. Please take a few minutes to complete the written survey that you may receive in the mail after your visit with us. Thank you!             Your Updated Medication List - Protect others around you: Learn how to safely use, store and throw away your medicines at www.disposemymeds.org.          This list is accurate as of: 11/13/17  5:41 PM.  Always use your most recent med list.                   Brand Name Dispense Instructions for use Diagnosis    NEXIUM PO      Take 20 mg by mouth every morning (before breakfast)        prenatal multivitamin plus  iron 27-0.8 MG Tabs per tablet      Take 1 tablet by mouth daily

## 2017-11-13 NOTE — NURSING NOTE
"Chief Complaint   Patient presents with     Prenatal Care   39w3d      Initial /86  Pulse 92  Temp 98  F (36.7  C) (Tympanic)  Resp 12  Wt 202 lb (91.6 kg)  LMP 02/10/2017 (Exact Date)  SpO2 97%  BMI 33.61 kg/m2 Estimated body mass index is 33.61 kg/(m^2) as calculated from the following:    Height as of 11/3/17: 5' 5\" (1.651 m).    Weight as of this encounter: 202 lb (91.6 kg).  Medication Reconciliation: complete    "

## 2017-11-13 NOTE — PROGRESS NOTES
Taking PNVs, no problems. Had about 45 minutes of contractions last night, no fluid leaking or bleeding.  Signs and symptoms of labor were reviewed.  She has a follow up with Dr. Conrad in a week.      Electronically signed by:  Darwin Rosen M.D.  11/13/2017

## 2017-11-20 ENCOUNTER — PRENATAL OFFICE VISIT (OUTPATIENT)
Dept: FAMILY MEDICINE | Facility: CLINIC | Age: 27
End: 2017-11-20
Payer: COMMERCIAL

## 2017-11-20 VITALS
TEMPERATURE: 96.9 F | RESPIRATION RATE: 16 BRPM | WEIGHT: 202.13 LBS | DIASTOLIC BLOOD PRESSURE: 82 MMHG | OXYGEN SATURATION: 98 % | SYSTOLIC BLOOD PRESSURE: 118 MMHG | BODY MASS INDEX: 33.64 KG/M2 | HEART RATE: 82 BPM

## 2017-11-20 DIAGNOSIS — Z34.03 ENCOUNTER FOR SUPERVISION OF NORMAL FIRST PREGNANCY IN THIRD TRIMESTER: Primary | ICD-10-CM

## 2017-11-20 PROCEDURE — 59425 ANTEPARTUM CARE ONLY: CPT | Performed by: FAMILY MEDICINE

## 2017-11-20 RX ORDER — OXYTOCIN/0.9 % SODIUM CHLORIDE 30/500 ML
100-340 PLASTIC BAG, INJECTION (ML) INTRAVENOUS CONTINUOUS PRN
Status: CANCELLED | OUTPATIENT
Start: 2017-11-20

## 2017-11-20 RX ORDER — IBUPROFEN 800 MG/1
800 TABLET, FILM COATED ORAL
Status: CANCELLED | OUTPATIENT
Start: 2017-11-20

## 2017-11-20 RX ORDER — LIDOCAINE 40 MG/G
CREAM TOPICAL
Status: CANCELLED | OUTPATIENT
Start: 2017-11-20

## 2017-11-20 RX ORDER — FENTANYL CITRATE 50 UG/ML
50-100 INJECTION, SOLUTION INTRAMUSCULAR; INTRAVENOUS
Status: CANCELLED | OUTPATIENT
Start: 2017-11-20

## 2017-11-20 RX ORDER — OXYTOCIN 10 [USP'U]/ML
10 INJECTION, SOLUTION INTRAMUSCULAR; INTRAVENOUS
Status: CANCELLED | OUTPATIENT
Start: 2017-11-20

## 2017-11-20 RX ORDER — OXYCODONE AND ACETAMINOPHEN 5; 325 MG/1; MG/1
1 TABLET ORAL
Status: CANCELLED | OUTPATIENT
Start: 2017-11-20

## 2017-11-20 RX ORDER — NALOXONE HYDROCHLORIDE 0.4 MG/ML
.1-.4 INJECTION, SOLUTION INTRAMUSCULAR; INTRAVENOUS; SUBCUTANEOUS
Status: CANCELLED | OUTPATIENT
Start: 2017-11-20

## 2017-11-20 RX ORDER — ACETAMINOPHEN 325 MG/1
650 TABLET ORAL EVERY 4 HOURS PRN
Status: CANCELLED | OUTPATIENT
Start: 2017-11-20

## 2017-11-20 RX ORDER — ONDANSETRON 2 MG/ML
4 INJECTION INTRAMUSCULAR; INTRAVENOUS EVERY 6 HOURS PRN
Status: CANCELLED | OUTPATIENT
Start: 2017-11-20

## 2017-11-20 RX ORDER — CARBOPROST TROMETHAMINE 250 UG/ML
250 INJECTION, SOLUTION INTRAMUSCULAR
Status: CANCELLED | OUTPATIENT
Start: 2017-11-20

## 2017-11-20 RX ORDER — SODIUM CHLORIDE, SODIUM LACTATE, POTASSIUM CHLORIDE, CALCIUM CHLORIDE 600; 310; 30; 20 MG/100ML; MG/100ML; MG/100ML; MG/100ML
INJECTION, SOLUTION INTRAVENOUS CONTINUOUS
Status: CANCELLED | OUTPATIENT
Start: 2017-11-20

## 2017-11-20 RX ORDER — METHYLERGONOVINE MALEATE 0.2 MG/ML
200 INJECTION INTRAVENOUS
Status: CANCELLED | OUTPATIENT
Start: 2017-11-20

## 2017-11-20 RX ORDER — OXYTOCIN/0.9 % SODIUM CHLORIDE 30/500 ML
1-24 PLASTIC BAG, INJECTION (ML) INTRAVENOUS CONTINUOUS
Status: CANCELLED | OUTPATIENT
Start: 2017-11-20

## 2017-11-20 ASSESSMENT — PAIN SCALES - GENERAL: PAINLEVEL: NO PAIN (0)

## 2017-11-20 NOTE — PROGRESS NOTES
I have discussed  induction with the pt.  I have dicussed the risks and benifits, and answered all questions.  I have also told the pt. that this may take more than one day.  Ann Cortes will report to OB if ctx's. Q 5-10 min.or if spont. rupture of membranes.  She will monitor fetal activity counts as directed.  Please call if persistant H/A, blurred vision, persistant swelling or continuous contraction  Orders placed

## 2017-11-20 NOTE — MR AVS SNAPSHOT
After Visit Summary   11/20/2017    Ann Cortes    MRN: [id number]           Patient Information     Date Of Birth          1990        Visit Information        Provider Department      11/20/2017 3:50 PM Dereck Conrad MD [facility]        Today's Diagnoses     Encounter for supervision of normal first pregnancy in third trimester    -  1       Follow-ups after your visit        Who to contact     If you have questions or need follow up information about today's clinic visit or your schedule please contact [facility] directly at [phone].  Normal or non-critical lab and imaging results will be communicated to you by Legend[employer] Pictures[employer], letter or phone within 4 business days after the clinic has received the results. If you do not hear from us within 7 days, please contact the clinic through [employer] or phone. If you have a critical or abnormal lab result, we will notify you by phone as soon as possible.  Submit refill requests through [employer] or call your pharmacy and they will forward the refill request to us. Please allow 3 business days for your refill to be completed.          Additional Information About Your Visit        MyChart Information     C[employer] gives you secure access to your electronic health record. If you see a primary care provider, you can also send messages to your care team and make appointments. If you have questions, please call your primary care clinic.  If you do not have a primary care provider, please call [phone] and they will assist you.        Care EveryWhere ID     This is your Care EveryWhere ID. This could be used by other organizations to access your [facility] medical records  [id number]        Your Vitals Were     Pulse Temperature Respirations Last Period Pulse Oximetry BMI (Body Mass Index)    82 96.9  F (36.1  C) (Tympanic) 16 02/10/2017 (Exact Date) 98% 33.64 kg/m2       Blood Pressure from Last 3 Encounters:    11/20/17 118/82   11/13/17 130/86   11/06/17 120/76    Weight from Last 3 Encounters:   11/20/17 202 lb 2 oz (91.7 kg)   11/13/17 202 lb (91.6 kg)   11/06/17 202 lb 8 oz (91.9 kg)              Today, you had the following     No orders found for display       Primary Care Provider Office Phone # Fax #    Dereck Conrad [phone] [address] [place] [address]        Equal Access to Services     [facility]: Hadii aad ku hadasho Soomaali, waaxda luqadaha, qaybta kaalmada adeegyada, burt rich . So [facility] [phone].    ATENCIÓN: Si habla español, tiene a manuel disposición servicios gratuitos de asistencia lingüística. [facility] [phone].    We comply with applicable federal civil rights laws and [place] laws. We do not discriminate on the basis of race, color, national origin, age, disability, sex, sexual orientation, or gender identity.            Thank you!     Thank you for choosing [facility]  for your care. Our goal is always to provide you with excellent care. Hearing back from our patients is one way we can continue to improve our services. Please take a few minutes to complete the written survey that you may receive in the mail after your visit with us. Thank you!             Your Updated Medication List - Protect others around you: Learn how to safely use, store and throw away your medicines at www.disposemymeds.org.          This list is accurate as of: 11/20/17  5:02 PM.  Always use your most recent med list.                   Brand Name Dispense Instructions for use Diagnosis    NEXIUM PO      Take 20 mg by mouth every morning (before breakfast)        prenatal multivitamin plus iron 27-0.8 MG Tabs per tablet      Take 1 tablet by mouth daily

## 2017-11-20 NOTE — NURSING NOTE
"Chief Complaint   Patient presents with     Prenatal Care     40w3d       Initial /82 (BP Location: Right arm, Patient Position: Chair, Cuff Size: Adult Regular)  Pulse 82  Temp 96.9  F (36.1  C) (Tympanic)  Resp 16  Wt 202 lb 2 oz (91.7 kg)  LMP 02/10/2017 (Exact Date)  SpO2 98%  BMI 33.64 kg/m2 Estimated body mass index is 33.64 kg/(m^2) as calculated from the following:    Height as of 11/3/17: 5' 5\" (1.651 m).    Weight as of this encounter: 202 lb 2 oz (91.7 kg).  Medication Reconciliation: complete   Health Maintenance Due   Topic Date Due     MATERNAL SCREENING  05/26/2017     OBGCT (OB)  07/28/2017     REPEAT ANTIBODY SCREEN (OB)  08/25/2017     RH IMMUNE GLOBULIN (OB)  08/25/2017     Orquidea Faye, [facility]      "

## 2017-11-24 ENCOUNTER — HOSPITAL ENCOUNTER (INPATIENT)
Facility: CLINIC | Age: 27
LOS: 2 days | Discharge: HOME | End: 2017-11-26
Attending: FAMILY MEDICINE | Admitting: FAMILY MEDICINE
Payer: COMMERCIAL

## 2017-11-24 ENCOUNTER — ANESTHESIA (OUTPATIENT)
Dept: OBGYN | Facility: CLINIC | Age: 27
End: 2017-11-24
Payer: COMMERCIAL

## 2017-11-24 ENCOUNTER — ANESTHESIA EVENT (OUTPATIENT)
Dept: OBGYN | Facility: CLINIC | Age: 27
End: 2017-11-24
Payer: COMMERCIAL

## 2017-11-24 DIAGNOSIS — O48.0 POST-TERM PREGNANCY, 40-42 WEEKS OF GESTATION: ICD-10-CM

## 2017-11-24 LAB
ABO + RH BLD: NORMAL
ABO + RH BLD: NORMAL
SPECIMEN EXP DATE BLD: NORMAL
T PALLIDUM IGG+IGM SER QL: NEGATIVE

## 2017-11-24 PROCEDURE — 3E033VJ INTRODUCTION OF OTHER HORMONE INTO PERIPHERAL VEIN, PERCUTANEOUS APPROACH: ICD-10-PCS | Performed by: FAMILY MEDICINE

## 2017-11-24 PROCEDURE — 25000125 ZZHC RX 250: Performed by: FAMILY MEDICINE

## 2017-11-24 PROCEDURE — 00HU33Z INSERTION OF INFUSION DEVICE INTO SPINAL CANAL, PERCUTANEOUS APPROACH: ICD-10-PCS | Performed by: NURSE ANESTHETIST, CERTIFIED REGISTERED

## 2017-11-24 PROCEDURE — 86780 TREPONEMA PALLIDUM: CPT | Performed by: FAMILY MEDICINE

## 2017-11-24 PROCEDURE — 25000125 ZZHC RX 250: Performed by: NURSE ANESTHETIST, CERTIFIED REGISTERED

## 2017-11-24 PROCEDURE — 37000011 ZZH ANESTHESIA WARD SERVICE: Performed by: NURSE ANESTHETIST, CERTIFIED REGISTERED

## 2017-11-24 PROCEDURE — 86900 BLOOD TYPING SEROLOGIC ABO: CPT | Performed by: FAMILY MEDICINE

## 2017-11-24 PROCEDURE — 25000132 ZZH RX MED GY IP 250 OP 250 PS 637: Performed by: FAMILY MEDICINE

## 2017-11-24 PROCEDURE — 59410 OBSTETRICAL CARE: CPT | Performed by: FAMILY MEDICINE

## 2017-11-24 PROCEDURE — S0020 INJECTION, BUPIVICAINE HYDRO: HCPCS | Performed by: NURSE ANESTHETIST, CERTIFIED REGISTERED

## 2017-11-24 PROCEDURE — 86901 BLOOD TYPING SEROLOGIC RH(D): CPT | Performed by: FAMILY MEDICINE

## 2017-11-24 PROCEDURE — 25000128 H RX IP 250 OP 636: Performed by: FAMILY MEDICINE

## 2017-11-24 PROCEDURE — 25000128 H RX IP 250 OP 636: Performed by: NURSE ANESTHETIST, CERTIFIED REGISTERED

## 2017-11-24 PROCEDURE — 72200001 ZZH LABOR CARE VAGINAL DELIVERY SINGLE

## 2017-11-24 PROCEDURE — 12000031 ZZH R&B OB CRITICAL

## 2017-11-24 PROCEDURE — 3E0R3BZ INTRODUCTION OF ANESTHETIC AGENT INTO SPINAL CANAL, PERCUTANEOUS APPROACH: ICD-10-PCS | Performed by: NURSE ANESTHETIST, CERTIFIED REGISTERED

## 2017-11-24 RX ORDER — IBUPROFEN 800 MG/1
800 TABLET, FILM COATED ORAL EVERY 8 HOURS PRN
Status: DISCONTINUED | OUTPATIENT
Start: 2017-11-24 | End: 2017-11-26 | Stop reason: HOSPADM

## 2017-11-24 RX ORDER — OXYTOCIN/0.9 % SODIUM CHLORIDE 30/500 ML
1-24 PLASTIC BAG, INJECTION (ML) INTRAVENOUS CONTINUOUS
Status: DISCONTINUED | OUTPATIENT
Start: 2017-11-24 | End: 2017-11-24

## 2017-11-24 RX ORDER — LIDOCAINE HYDROCHLORIDE 10 MG/ML
1 INJECTION, SOLUTION EPIDURAL; INFILTRATION; INTRACAUDAL; PERINEURAL ONCE
Status: DISCONTINUED | OUTPATIENT
Start: 2017-11-24 | End: 2017-11-24

## 2017-11-24 RX ORDER — LIDOCAINE 40 MG/G
CREAM TOPICAL
Status: DISCONTINUED | OUTPATIENT
Start: 2017-11-24 | End: 2017-11-24

## 2017-11-24 RX ORDER — OXYTOCIN/0.9 % SODIUM CHLORIDE 30/500 ML
100 PLASTIC BAG, INJECTION (ML) INTRAVENOUS CONTINUOUS
Status: DISCONTINUED | OUTPATIENT
Start: 2017-11-24 | End: 2017-11-26 | Stop reason: HOSPADM

## 2017-11-24 RX ORDER — OXYCODONE AND ACETAMINOPHEN 5; 325 MG/1; MG/1
1 TABLET ORAL
Status: DISCONTINUED | OUTPATIENT
Start: 2017-11-24 | End: 2017-11-24

## 2017-11-24 RX ORDER — MISOPROSTOL 200 UG/1
400 TABLET ORAL
Status: DISCONTINUED | OUTPATIENT
Start: 2017-11-24 | End: 2017-11-26 | Stop reason: HOSPADM

## 2017-11-24 RX ORDER — FENTANYL CITRATE 50 UG/ML
50-100 INJECTION, SOLUTION INTRAMUSCULAR; INTRAVENOUS
Status: DISCONTINUED | OUTPATIENT
Start: 2017-11-24 | End: 2017-11-24

## 2017-11-24 RX ORDER — IBUPROFEN 800 MG/1
800 TABLET, FILM COATED ORAL
Status: DISCONTINUED | OUTPATIENT
Start: 2017-11-24 | End: 2017-11-24

## 2017-11-24 RX ORDER — PANTOPRAZOLE SODIUM 20 MG/1
20 TABLET, DELAYED RELEASE ORAL
Status: DISCONTINUED | OUTPATIENT
Start: 2017-11-25 | End: 2017-11-24

## 2017-11-24 RX ORDER — ACETAMINOPHEN 325 MG/1
650 TABLET ORAL EVERY 4 HOURS PRN
Status: DISCONTINUED | OUTPATIENT
Start: 2017-11-24 | End: 2017-11-24

## 2017-11-24 RX ORDER — ONDANSETRON 2 MG/ML
4 INJECTION INTRAMUSCULAR; INTRAVENOUS EVERY 6 HOURS PRN
Status: DISCONTINUED | OUTPATIENT
Start: 2017-11-24 | End: 2017-11-24

## 2017-11-24 RX ORDER — IBUPROFEN 600 MG/1
600 TABLET, FILM COATED ORAL EVERY 6 HOURS PRN
Status: DISCONTINUED | OUTPATIENT
Start: 2017-11-24 | End: 2017-11-26 | Stop reason: HOSPADM

## 2017-11-24 RX ORDER — AMOXICILLIN 250 MG
2 CAPSULE ORAL 2 TIMES DAILY PRN
Status: DISCONTINUED | OUTPATIENT
Start: 2017-11-24 | End: 2017-11-26 | Stop reason: HOSPADM

## 2017-11-24 RX ORDER — ACETAMINOPHEN 325 MG/1
650 TABLET ORAL EVERY 4 HOURS PRN
Status: DISCONTINUED | OUTPATIENT
Start: 2017-11-24 | End: 2017-11-26 | Stop reason: HOSPADM

## 2017-11-24 RX ORDER — OXYTOCIN 10 [USP'U]/ML
10 INJECTION, SOLUTION INTRAMUSCULAR; INTRAVENOUS
Status: DISCONTINUED | OUTPATIENT
Start: 2017-11-24 | End: 2017-11-24

## 2017-11-24 RX ORDER — CARBOPROST TROMETHAMINE 250 UG/ML
250 INJECTION, SOLUTION INTRAMUSCULAR
Status: DISCONTINUED | OUTPATIENT
Start: 2017-11-24 | End: 2017-11-24

## 2017-11-24 RX ORDER — HYDROCORTISONE 2.5 %
CREAM (GRAM) TOPICAL 3 TIMES DAILY PRN
Status: DISCONTINUED | OUTPATIENT
Start: 2017-11-24 | End: 2017-11-26 | Stop reason: HOSPADM

## 2017-11-24 RX ORDER — PRENATAL VIT/IRON FUM/FOLIC AC 27MG-0.8MG
1 TABLET ORAL DAILY
Status: DISCONTINUED | OUTPATIENT
Start: 2017-11-24 | End: 2017-11-26 | Stop reason: HOSPADM

## 2017-11-24 RX ORDER — NALOXONE HYDROCHLORIDE 0.4 MG/ML
.1-.4 INJECTION, SOLUTION INTRAMUSCULAR; INTRAVENOUS; SUBCUTANEOUS
Status: DISCONTINUED | OUTPATIENT
Start: 2017-11-24 | End: 2017-11-26 | Stop reason: HOSPADM

## 2017-11-24 RX ORDER — BUPIVACAINE HYDROCHLORIDE 2.5 MG/ML
INJECTION, SOLUTION INFILTRATION; PERINEURAL PRN
Status: DISCONTINUED | OUTPATIENT
Start: 2017-11-24 | End: 2017-11-26

## 2017-11-24 RX ORDER — PENICILLIN G POTASSIUM 5000000 [IU]/1
5 INJECTION, POWDER, FOR SOLUTION INTRAMUSCULAR; INTRAVENOUS ONCE
Status: COMPLETED | OUTPATIENT
Start: 2017-11-24 | End: 2017-11-24

## 2017-11-24 RX ORDER — BISACODYL 10 MG
10 SUPPOSITORY, RECTAL RECTAL DAILY PRN
Status: DISCONTINUED | OUTPATIENT
Start: 2017-11-26 | End: 2017-11-26 | Stop reason: HOSPADM

## 2017-11-24 RX ORDER — NALOXONE HYDROCHLORIDE 0.4 MG/ML
.1-.4 INJECTION, SOLUTION INTRAMUSCULAR; INTRAVENOUS; SUBCUTANEOUS
Status: DISCONTINUED | OUTPATIENT
Start: 2017-11-24 | End: 2017-11-24

## 2017-11-24 RX ORDER — SODIUM CHLORIDE, SODIUM LACTATE, POTASSIUM CHLORIDE, CALCIUM CHLORIDE 600; 310; 30; 20 MG/100ML; MG/100ML; MG/100ML; MG/100ML
INJECTION, SOLUTION INTRAVENOUS CONTINUOUS
Status: DISCONTINUED | OUTPATIENT
Start: 2017-11-24 | End: 2017-11-24

## 2017-11-24 RX ORDER — AMOXICILLIN 250 MG
1 CAPSULE ORAL 2 TIMES DAILY PRN
Status: DISCONTINUED | OUTPATIENT
Start: 2017-11-24 | End: 2017-11-26 | Stop reason: HOSPADM

## 2017-11-24 RX ORDER — LANOLIN 100 %
OINTMENT (GRAM) TOPICAL
Status: DISCONTINUED | OUTPATIENT
Start: 2017-11-24 | End: 2017-11-26 | Stop reason: HOSPADM

## 2017-11-24 RX ORDER — LIDOCAINE HYDROCHLORIDE AND EPINEPHRINE 15; 5 MG/ML; UG/ML
INJECTION, SOLUTION EPIDURAL PRN
Status: DISCONTINUED | OUTPATIENT
Start: 2017-11-24 | End: 2017-11-26

## 2017-11-24 RX ORDER — OXYTOCIN 10 [USP'U]/ML
10 INJECTION, SOLUTION INTRAMUSCULAR; INTRAVENOUS
Status: DISCONTINUED | OUTPATIENT
Start: 2017-11-24 | End: 2017-11-26 | Stop reason: HOSPADM

## 2017-11-24 RX ORDER — OXYTOCIN/0.9 % SODIUM CHLORIDE 30/500 ML
340 PLASTIC BAG, INJECTION (ML) INTRAVENOUS CONTINUOUS PRN
Status: DISCONTINUED | OUTPATIENT
Start: 2017-11-24 | End: 2017-11-26 | Stop reason: HOSPADM

## 2017-11-24 RX ORDER — ONDANSETRON 4 MG/1
4 TABLET, ORALLY DISINTEGRATING ORAL EVERY 6 HOURS PRN
Status: DISCONTINUED | OUTPATIENT
Start: 2017-11-24 | End: 2017-11-24

## 2017-11-24 RX ORDER — IBUPROFEN 800 MG/1
800 TABLET, FILM COATED ORAL EVERY 6 HOURS PRN
Status: DISCONTINUED | OUTPATIENT
Start: 2017-11-24 | End: 2017-11-26 | Stop reason: HOSPADM

## 2017-11-24 RX ORDER — EPHEDRINE SULFATE 50 MG/ML
5 INJECTION, SOLUTION INTRAMUSCULAR; INTRAVENOUS; SUBCUTANEOUS
Status: DISCONTINUED | OUTPATIENT
Start: 2017-11-24 | End: 2017-11-24

## 2017-11-24 RX ORDER — OXYTOCIN/0.9 % SODIUM CHLORIDE 30/500 ML
100-340 PLASTIC BAG, INJECTION (ML) INTRAVENOUS CONTINUOUS PRN
Status: DISCONTINUED | OUTPATIENT
Start: 2017-11-24 | End: 2017-11-24

## 2017-11-24 RX ORDER — NALBUPHINE HYDROCHLORIDE 10 MG/ML
2.5-5 INJECTION, SOLUTION INTRAMUSCULAR; INTRAVENOUS; SUBCUTANEOUS EVERY 6 HOURS PRN
Status: DISCONTINUED | OUTPATIENT
Start: 2017-11-24 | End: 2017-11-24

## 2017-11-24 RX ORDER — METHYLERGONOVINE MALEATE 0.2 MG/ML
200 INJECTION INTRAVENOUS
Status: DISCONTINUED | OUTPATIENT
Start: 2017-11-24 | End: 2017-11-24

## 2017-11-24 RX ADMIN — SODIUM CHLORIDE 2.5 MILLION UNITS: 9 INJECTION, SOLUTION INTRAVENOUS at 12:07

## 2017-11-24 RX ADMIN — SODIUM CHLORIDE, POTASSIUM CHLORIDE, SODIUM LACTATE AND CALCIUM CHLORIDE 1000 ML: 600; 310; 30; 20 INJECTION, SOLUTION INTRAVENOUS at 15:13

## 2017-11-24 RX ADMIN — PENICILLIN G POTASSIUM 5 MILLION UNITS: 5000000 POWDER, FOR SOLUTION INTRAMUSCULAR; INTRAPLEURAL; INTRATHECAL; INTRAVENOUS at 08:16

## 2017-11-24 RX ADMIN — ONDANSETRON 4 MG: 2 INJECTION INTRAMUSCULAR; INTRAVENOUS at 20:49

## 2017-11-24 RX ADMIN — SODIUM CHLORIDE 2.5 MILLION UNITS: 9 INJECTION, SOLUTION INTRAVENOUS at 16:45

## 2017-11-24 RX ADMIN — LIDOCAINE HYDROCHLORIDE,EPINEPHRINE BITARTRATE 2 ML: 15; .005 INJECTION, SOLUTION EPIDURAL; INFILTRATION; INTRACAUDAL; PERINEURAL at 16:01

## 2017-11-24 RX ADMIN — FENTANYL CITRATE 100 MCG: 50 INJECTION, SOLUTION INTRAMUSCULAR; INTRAVENOUS at 15:24

## 2017-11-24 RX ADMIN — BUPIVACAINE HYDROCHLORIDE 10 ML: 2.5 INJECTION, SOLUTION EPIDURAL; INFILTRATION; INTRACAUDAL; PERINEURAL at 16:01

## 2017-11-24 RX ADMIN — SODIUM CHLORIDE 2.5 MILLION UNITS: 9 INJECTION, SOLUTION INTRAVENOUS at 20:49

## 2017-11-24 RX ADMIN — Medication: at 16:08

## 2017-11-24 RX ADMIN — SODIUM CHLORIDE, POTASSIUM CHLORIDE, SODIUM LACTATE AND CALCIUM CHLORIDE: 600; 310; 30; 20 INJECTION, SOLUTION INTRAVENOUS at 08:10

## 2017-11-24 RX ADMIN — IBUPROFEN 800 MG: 800 TABLET ORAL at 23:05

## 2017-11-24 RX ADMIN — LIDOCAINE HYDROCHLORIDE,EPINEPHRINE BITARTRATE 3 ML: 15; .005 INJECTION, SOLUTION EPIDURAL; INFILTRATION; INTRACAUDAL; PERINEURAL at 15:56

## 2017-11-24 RX ADMIN — SODIUM CHLORIDE, POTASSIUM CHLORIDE, SODIUM LACTATE AND CALCIUM CHLORIDE: 600; 310; 30; 20 INJECTION, SOLUTION INTRAVENOUS at 16:43

## 2017-11-24 RX ADMIN — SODIUM CHLORIDE, POTASSIUM CHLORIDE, SODIUM LACTATE AND CALCIUM CHLORIDE 1000 ML: 600; 310; 30; 20 INJECTION, SOLUTION INTRAVENOUS at 15:47

## 2017-11-24 RX ADMIN — OXYTOCIN-SODIUM CHLORIDE 0.9% IV SOLN 30 UNIT/500ML 2 MILLI-UNITS/MIN: 30-0.9/5 SOLUTION at 08:18

## 2017-11-24 RX ADMIN — SODIUM CHLORIDE, POTASSIUM CHLORIDE, SODIUM LACTATE AND CALCIUM CHLORIDE: 600; 310; 30; 20 INJECTION, SOLUTION INTRAVENOUS at 15:56

## 2017-11-24 NOTE — H&P
[facility] Labor and Delivery History and Physical    Ann Cortes MRN# [id number]   Age: 27 year old YOB: 1990     Date of Admission:  2017    Primary care provider: Dereck Conrad           Chief Complaint:   Ann Cortes is a 27 year old female with  who is 41w0d pregnant and being admitted for induction of labor, indication post-dates.  Been induced since earlier this morning by Dr. Conrad.  Tolerated the Pitocin well; minimal pain with contractions.  Doing okay and has no concern.  Feels ready to have the baby.  Normal fetal movement and does not feel much of the contractions.  Feeling a lot of pelvic pressure however.  No abnormal discharge or bleeding.  No leakage. No UTI symptoms.  No headache or dizziness.  No acute change in her vision.  No URI symptoms, include running nose, nasal congestion,  coughing, fever or chill. No N/V/D/C and denies having any problem with urination.      No complication with the pregnancy and she has good prenatal care. RAH was based on early ultrasound. GBS was positive. Her one hour glucose test was normal.           Pregnancy history:     OBSTETRIC HISTORY:    Obstetric History       T0      L0     SAB0   TAB0   Ectopic0   Multiple0   Live Births0       # Outcome Date GA Lbr Popeye/2nd Weight Sex Delivery Anes PTL Lv   1 Current               Obstetric Comments   EDC 2017 based on LMP and confirmed by early ultrasound.  Transferring care at 31 weeks to [facility] from [place].   to [place].         EDC: Estimated Date of Delivery: 17    Prenatal Labs:   Lab Results   Component Value Date    ABO B 2017    RH Pos 2017       GBS Status:   Lab Results   Component Value Date    GBS Positive (A) 10/23/2017       Active Problem List  Patient Active Problem List   Diagnosis     Supervision of normal first pregnancy     Post-dates pregnancy       Medication Prior to Admission  Prescriptions  Prior to Admission   Medication Sig Dispense Refill Last Dose     Esomeprazole Magnesium (NEXIUM PO) Take 20 mg by mouth every morning (before breakfast)   11/23/2017 at 2100     Prenatal Vit-Fe Fumarate-FA (PRENATAL MULTIVITAMIN PLUS IRON) 27-0.8 MG TABS per tablet Take 1 tablet by mouth daily   11/23/2017 at 2100   .        Maternal Past Medical History:     Past Medical History:   Diagnosis Date     NO ACTIVE PROBLEMS                        Family History:     Family History   Problem Relation Age of Onset     DIABETES Paternal Grandfather      Heart Failure Paternal Grandfather      CANCER Paternal Grandmother      breast cancer     Hypertension Mother      Hyperlipidemia Mother      Family history reviewed and updated in [facility]            Social History:     Social History     Social History     Marital status:      Spouse name: N/A     Number of children: N/A     Years of education: N/A     Occupational History     Not on file.     Social History Main Topics     Smoking status: Never Smoker     Smokeless tobacco: Never Used      Comment:      Alcohol use No     Drug use: No     Sexual activity: Yes     Partners: Male     Birth control/ protection: None     Other Topics Concern     Not on file     Social History Narrative    9/2017  Lives in [place] with , Zachary and Ann's parents.  No smokers in the home.  No concerns about domestic violence.  Aware of toxoplasmosis precautions.            Review of Systems:   The Review of Systems is negative other than noted in the HPI          Physical Exam:     Vitals were reviewed  Patient Vitals for the past 8 hrs:   BP Temp Temp src Pulse Resp   11/24/17 1100 132/79 98  F (36.7  C) Oral 79 16   11/24/17 0723 122/84 98  F (36.7  C) Oral - 16     Constitutional:   awake, alert, cooperative, no apparent distress, and appears stated age     Lungs:   No increased work of breathing, good air exchange, clear to auscultation bilaterally, no crackles or  wheezing     Cardiovascular:   Normal apical impulse, regular rate and rhythm, normal S1 and S2, no S3 or S4, and no murmur noted     Abdomen:   Soft, appropriately for gestational age.  No tender with palpation to the fundus.       Genitounirinary:   External Genitalia:  General appearance; normal, Lesions absent     Musculoskeletal:   no lower extremity pitting edema present     Skin:   no rashes and no lesions      Cervix:   Membranes: SROM   Dilation: 5   Effacement: 90%   Station:-2   Consistency: soft   Position: Posterior  Presentation:Cephalic  Fetal Heart Rate Tracing: reactive and reassuring, variables moderate, decelerations none, Tier 1 (normal)  Tocometer: external monitor                       Assessment:   Ann Cortes is a 41w0d pregnant female admitted with induction of labor, indication post-dates.  Doing well. SROM with bloody amniotic fluid. GBS was positive - adequate antibiotic treatment.          Plan:   Admit - see IP orders  Pain medication per patient's desire.  Options discussed.  Planned for epidural  Anticipate   Labor induction with Pitocin  A/P discussed - risks associate with induction discussed.    Evi Oconnor Mai, MD

## 2017-11-24 NOTE — PROGRESS NOTES
Dr. Conrad called and stated he will be signing this patient out to Dr. Roe and he will call Dr. Roe to verify this.

## 2017-11-24 NOTE — ANESTHESIA PROCEDURE NOTES
Peripheral nerve/Neuraxial procedure note : epidural catheter  Pre-Procedure    Location: OB    Procedure Times:11/24/2017 3:56 PM  Pre-Anesthestic Checklist: patient identified, IV checked, risks and benefits discussed, informed consent, monitors and equipment checked, pre-op evaluation and at physician/surgeon's request    Timeout  Correct Patient: Yes   Correct Procedure: Yes   Correct Site: Yes   Correct Laterality: N/A   Correct Position: Yes   Site Marked: N/A   .   Procedure Documentation    .    Procedure:    Epidural catheter.  Insertion Site:L3-4  (midline approach) Injection technique: LORT air   Local skin infiltrated with 3 mL of 1% lidocaine.       Patient Prep;mask, sterile gloves, povidone-iodine 7.5% surgical scrub, patient draped.  .  Needle: Touhy needle, Guillory Needle Gauge: 18.    Needle Length (Inches) 3.5  # of attempts: 1 and  # of redirects:  1 .   Catheter: 20 G . .  Catheter threaded easily  5 cm epidural space.  .   .    Assessment/Narrative  Paresthesias: No.  .  .  Aspiration negative for heme or CSF  . Test dose of 3 mL lidocaine 1.5% w/ 1:200,000 epinephrine at 15:56.  Test dose negative for signs of intravascular, subdural or intrathecal injection. Sensory Level Left: T6  Sensory Level Right: T6  Comments:  Pt tolerated the procedure well.  No complications.  Dilated to 5 cm last check and 41 wks gestation.  Increased BMI however the landmarks were easily palpable.  Pump started after the TD and initial bolus.  Pain to 0/10.  I will follow up with the pt if needed.

## 2017-11-24 NOTE — PROGRESS NOTES
Dr. Roe at bedside, SROM occurred during SVE @ 1440. Bloody fluid. Now fluid is clear. Cervix is 5cm/90%/-1station.   Pitocin infusion decreased in half to 9mU/min. after rupture of membranes and will monitor ctx pattern. Patient is feeling more uncomfortable now and states she will want an epidural in a little while. Report to FAMILIA Aldridge now.

## 2017-11-24 NOTE — PLAN OF CARE
Problem: Labor (Cervical Ripen, Induct, Augment) (Adult,Obstetrics,Pediatric)  Goal: Signs and Symptoms of Listed Potential Problems Will be Absent, Minimized or Managed (Labor)  Signs and symptoms of listed potential problems will be absent, minimized or managed by discharge/transition of care (reference Labor (Cervical Ripen, Induct, Augment) (Adult,Obstetrics,Pediatric) CPG).   S: Patient desires Epidural  B: Patient is a  who presented for pitocin induced labor, She is now 5 dialated, FHT's 130  A: Zachary STEPHENS CRNA called and in room at 1540. Patient and procedure correctly identified/verified with CRNA. Consent signed. 1200 ml fluid bolus given. Patient in position for epidural placement. Epidural placed without complications. Test dose/bolus given by CRNA at 1556, epidural dose at 1601 and epidural pump started at 1609;  patient tolerated well. Patient rates her pain after procedure as 0/10.  R: Will continue to monitor. Pitocin remains at 9 mu/hr

## 2017-11-24 NOTE — IP AVS SNAPSHOT
[facility]    [address]     [place] [address]    Phone:  [phone]                                       After Visit Summary   11/24/2017    Ann Cortes    MRN: [id number]           After Visit Summary Signature Page     I have received my discharge instructions, and my questions have been answered. I have discussed any challenges I see with this plan with the nurse or doctor.    ..........................................................................................................................................  Patient/Patient Representative Signature      ..........................................................................................................................................  Patient Representative Print Name and Relationship to Patient    ..................................................               ................................................  Date                                            Time    ..........................................................................................................................................  Reviewed by Signature/Title    ...................................................              ..............................................  Date                                                            Time

## 2017-11-24 NOTE — ANESTHESIA PREPROCEDURE EVALUATION
Anesthesia Evaluation     .      No history of anesthetic complications          ROS/MED HX    ENT/Pulmonary:  - neg pulmonary ROS     Neurologic:  - neg neurologic ROS     Cardiovascular:  - neg cardiovascular ROS       METS/Exercise Tolerance:     Hematologic:         Musculoskeletal:         GI/Hepatic:  - neg GI/hepatic ROS       Renal/Genitourinary:         Endo:         Psychiatric:         Infectious Disease:         Malignancy:         Other:                     Physical Exam  Normal systems: cardiovascular, pulmonary and dental    Airway   Mallampati: II  TM distance: > 3 FB  Neck ROM: full  Mouth opening: > 3 cm    Dental     Cardiovascular       Pulmonary           neg OB ROS                 Anesthesia Plan      History & Physical Review      ASA Status:  .  OB Epidural Asa: 2            Postoperative Care      Consents  Anesthetic plan, risks, benefits and alternatives discussed with:  Patient..                          .

## 2017-11-24 NOTE — PROGRESS NOTES
S: Induction of labor  B: Patient is a  who presents for scheduled induction of labor @ 41w0d gestation. GBS positive.  A: Pt denies pain. VSS. EFW 8# per MD. FHT's 140 bpm, moderate variability, accels present, no decels. One contraction lasting 70 seconds since being on the monitor. Pt. Not feeling contraction. Understands that she will be on continuous EFM throughout induction.Patient agrees with plan of care.   R: Start IV and PCN for GBS status. Will check cervix prior to starting Pitocin. Observe for active phase of labor and fetal tolerance.

## 2017-11-24 NOTE — PLAN OF CARE
Problem: Patient Care Overview  Goal: Individualization & Mutuality  Outcome: Improving  S: Shift note    B: , 41w0d gestation here for Pitocin Induction, GBS positive, membranes intact    A: Patient's Pitocin induction started @ 0821 today. Steadily increased infusion and is now @ 18mU/min. Contractions approx every 2-3 minutes apart, lasting 40-70 seconds, sometimes longer on occasion. Patient states they are mild in intensity. Definitely stronger since this morning. FHT's have been baseline 125-135bpm, moderate variability, accels present and occasional VD. Short period of time of minimal variability. Has had 2 doses of IV PCN now. Tolerating contractions well, denies need for pain meds. Has walked and sat on the birthing ball. Labor expectations and plan reviewed with patient and spouse.    here now to evaluate in person and possible AROM.     R: Continue labor care and Pitocin as tolerated. Monitor fetal tolerance. Will report to oncoming RN @ 1500.

## 2017-11-24 NOTE — IP AVS SNAPSHOT
MRN:[id number]                      After Visit Summary   11/24/2017    Ann Cortes    MRN: [id number]           Thank you!     Thank you for choosing [facility] for your care. Our goal is always to provide you with excellent care. Hearing back from our patients is one way we can continue to improve our services. Please take a few minutes to complete the written survey that you may receive in the mail after you visit with us. Thank you!        Patient Information     Date Of Birth          1990        Designated Caregiver       Most Recent Value    Caregiver    Will someone help with your care after discharge? no      About your hospital stay     You were admitted on:  November 24, 2017 You last received care in the:  [facility]    You were discharged on:  November 26, 2017       Who to Call     For medical emergencies, please call 911.  For non-urgent questions about your medical care, please call your primary care provider or clinic, [phone]          Attending Provider     Provider Specialty    Dereck Conrad MD Family Practice       Primary Care Provider Office Phone # Fax #    Dereck Conrad [phone]      Further instructions from your care team       Postpartum Vaginal Delivery Instructions    Activity       Ask family and friends for help when you need it.    Do not place anything in your vagina for 6 weeks.    You are not restricted on other activities, but take it easy for a few weeks to allow your body to recover from delivery.  You are able to do any activities you feel up to that point.    No driving until you have stopped taking your pain medications (usually two weeks after delivery).     Call your health care provider if you have any of these symptoms:       Increased pain, swelling, redness, or fluid around your stiches from an episiotomy or perineal tear.    A fever above 100.4 F (38 C) with or without chills when placing  a thermometer under your tongue.    You soak a sanitary pad with blood within 1 hour, or you see blood clots larger than a golf ball.    Bleeding that lasts more than 6 weeks.    Vaginal discharge that smells bad.    Severe pain, cramping or tenderness in your lower belly area.    A need to urinate more frequently (use the toilet more often), more urgently (use the toilet very quickly), or it burns when you urinate.    Nausea and vomiting.    Redness, swelling or pain around a vein in your leg.    Problems breastfeeding or a red or painful area on your breast.    Chest pain and cough or are gasping for air.    Problems coping with sadness, anxiety, or depression.  If you have any concerns about hurting yourself or the baby, call your provider immediately.     You have questions or concerns after you return home.     Keep your hands clean:  Always wash your hands before touching your perineal area and stitches.  This helps reduce your risk of infection.  If your hands aren't dirty, you may use an alcohol hand-rub to clean your hands. Keep your nails clean and short.        [facility] Discharge Instructions     Discharge disposition:  Discharged to home       Diet:  Regular       Activity Pelvic rest: abstain from intercourse and do not use tampons for 6 week(s). Advance activities as tolerated       Follow-up: Follow up with primary care provider in 6 weeks       Additional instructions: Thank you for choosing [facility] for your OB care. It was a pleasure to meet you and your family.  It also was an honor for us at [facility] to be one of the first ones to welcome your handsome and adorable boy into this world. Congratulations and again thank you.  Dr. Roe        Additional Patient Information:  Please give us a call if you have any concern or questions.      Pending Results     No orders found from 11/22/2017 to 11/25/2017.            Statement of Approval     Ordered           11/26/17 1155  I have reviewed and agree with all the recommendations and orders detailed in this document.  EFFECTIVE NOW     Approved and electronically signed by:  Evi Roe MD             Admission Information     Date & Time Provider Department Dept. Phone    11/24/2017 Dereck Conrad MD [facility] [phone]      Your Vitals Were     Blood Pressure Pulse Temperature Respirations Last Period Pulse Oximetry    126/78 65 97.6  F (36.4  C) (Oral) 16 02/10/2017 (Exact Date) 97%      MyChart Information     [facility] gives you secure access to your electronic health record. If you see a primary care provider, you can also send messages to your care team and make appointments. If you have questions, please call your primary care clinic.  If you do not have a primary care provider, please call [phone] and they will assist you.        Care EveryWhere ID     This is your Care EveryWhere ID. This could be used by other organizations to access your [place] medical records  [id number]        Equal Access to Services     JUNIOR RICCI : Hadii aad ku hadasho Sokallieali, waaxda luqadaha, qaybta kaalmada adeegyada, burt rich . So [facility] [phone].    ATENCIÓN: Si habla español, tiene a manuel disposición servicios gratuitos de asistencia lingüística. Llame al [phone].    We comply with applicable federal civil rights laws and [place] laws. We do not discriminate on the basis of race, color, national origin, age, disability, sex, sexual orientation, or gender identity.               Review of your medicines      START taking        Dose / Directions    ibuprofen 800 MG tablet   Commonly known as:  ADVIL/MOTRIN   Used for:  Vaginal delivery        Dose:  800 mg   Take 1 tablet (800 mg) by mouth every 8 hours as needed for other (cramping)   Quantity:  90 tablet   Refills:  0       order for DME   Used for:  Vaginal delivery        Equipment being ordered: Breast  pump and its supplies   Quantity:  1 Units   Refills:  0         CONTINUE these medicines which have NOT CHANGED        Dose / Directions    NEXIUM PO        Dose:  20 mg   Take 20 mg by mouth every morning (before breakfast)   Refills:  0       prenatal multivitamin plus iron 27-0.8 MG Tabs per tablet        Dose:  1 tablet   Take 1 tablet by mouth daily   Refills:  0            Where to get your medicines      These medications were sent to [facility], [place] - [address]  [address], [facility] [address]     Phone:  [phone]     ibuprofen 800 MG tablet         Some of these will need a paper prescription and others can be bought over the counter. Ask your nurse if you have questions.     Bring a paper prescription for each of these medications     order for DME                Protect others around you: Learn how to safely use, store and throw away your medicines at www.disposemymeds.org.             Medication List: This is a list of all your medications and when to take them. Check marks below indicate your daily home schedule. Keep this list as a reference.      Medications           Morning Afternoon Evening Bedtime As Needed    ibuprofen 800 MG tablet   Commonly known as:  ADVIL/MOTRIN   Take 1 tablet (800 mg) by mouth every 8 hours as needed for other (cramping)   Last time this was given:  600 mg on 11/26/2017 12:59 AM                                NEXIUM PO   Take 20 mg by mouth every morning (before breakfast)                                order for DME   Equipment being ordered: Breast pump and its supplies                                prenatal multivitamin plus iron 27-0.8 MG Tabs per tablet   Take 1 tablet by mouth daily   Last time this was given:  1 tablet on 11/26/2017  9:27 AM

## 2017-11-24 NOTE — PROGRESS NOTES
Off of monitor @ 1000 to walk in halls for a short period of time. Will put on monitor when patient returns to room.

## 2017-11-25 LAB — HGB BLD-MCNC: 11.3 G/DL (ref 11.7–15.7)

## 2017-11-25 PROCEDURE — 12000029 ZZH R&B OB INTERMEDIATE

## 2017-11-25 PROCEDURE — 85018 HEMOGLOBIN: CPT | Performed by: FAMILY MEDICINE

## 2017-11-25 PROCEDURE — 90715 TDAP VACCINE 7 YRS/> IM: CPT | Performed by: FAMILY MEDICINE

## 2017-11-25 PROCEDURE — 25000128 H RX IP 250 OP 636: Performed by: FAMILY MEDICINE

## 2017-11-25 PROCEDURE — 36415 COLL VENOUS BLD VENIPUNCTURE: CPT | Performed by: FAMILY MEDICINE

## 2017-11-25 PROCEDURE — 25000132 ZZH RX MED GY IP 250 OP 250 PS 637: Performed by: FAMILY MEDICINE

## 2017-11-25 RX ADMIN — IBUPROFEN 800 MG: 800 TABLET ORAL at 11:58

## 2017-11-25 RX ADMIN — ACETAMINOPHEN 650 MG: 325 TABLET ORAL at 15:20

## 2017-11-25 RX ADMIN — ACETAMINOPHEN 650 MG: 325 TABLET ORAL at 08:22

## 2017-11-25 RX ADMIN — PRENATAL VIT W/ FE FUMARATE-FA TAB 27-0.8 MG 1 TABLET: 27-0.8 TAB at 08:22

## 2017-11-25 RX ADMIN — IBUPROFEN 800 MG: 800 TABLET ORAL at 05:28

## 2017-11-25 RX ADMIN — CLOSTRIDIUM TETANI TOXOID ANTIGEN (FORMALDEHYDE INACTIVATED), CORYNEBACTERIUM DIPHTHERIAE TOXOID ANTIGEN (FORMALDEHYDE INACTIVATED), BORDETELLA PERTUSSIS TOXOID ANTIGEN (GLUTARALDEHYDE INACTIVATED), BORDETELLA PERTUSSIS FILAMENTOUS HEMAGGLUTININ ANTIGEN (FORMALDEHYDE INACTIVATED), BORDETELLA PERTUSSIS PERTACTIN ANTIGEN, AND BORDETELLA PERTUSSIS FIMBRIAE 2/3 ANTIGEN 0.5 ML: 5; 2; 2.5; 5; 3; 5 INJECTION, SUSPENSION INTRAMUSCULAR at 08:23

## 2017-11-25 RX ADMIN — IBUPROFEN 800 MG: 800 TABLET ORAL at 18:05

## 2017-11-25 RX ADMIN — Medication: at 18:05

## 2017-11-25 RX ADMIN — ACETAMINOPHEN 650 MG: 325 TABLET ORAL at 19:44

## 2017-11-25 RX ADMIN — SENNOSIDES AND DOCUSATE SODIUM 2 TABLET: 8.6; 5 TABLET ORAL at 08:22

## 2017-11-25 NOTE — PLAN OF CARE
Problem: Labor (Cervical Ripen, Induct, Augment) (Adult,Obstetrics,Pediatric)  Goal: Signs and Symptoms of Listed Potential Problems Will be Absent, Minimized or Managed (Labor)  Signs and symptoms of listed potential problems will be absent, minimized or managed by discharge/transition of care (reference Labor (Cervical Ripen, Induct, Augment) (Adult,Obstetrics,Pediatric) CPG).   Outcome: Completed Date Met: 17  S: Delivery  B: inducedLabor,  @ 41wks 0 days gestation, GBS positive with antibiotic treatment greater than 4 hours prior to delivery.  A: Patient delivered Vaginal at 2233 with Dr. Roe and Estevan HUANG MS3 in attendance. Baby delivered and placed on mother's low abdomen for delayed cord clamping where baby was dried and stimulated. After cord clamped and cut, baby was placed skin to skin on mother's chest within 5 minutes following delivery . Apgars 9/9. Placenta was delivered @ 2237 followed by administration of IV oxytocin. Bonding initiated with mom and baby. Educated mother on importance of exclusive breast feeding, expected feeding readiness cues and encouraged her to observe for feeding cues. Mother informed that breast feeding assistance would be provided. See flowsheet for VS and PP checks. Labor care plan goals met.  R: Expect routine postpartum care. Anticipate first feeding within the hour.

## 2017-11-25 NOTE — PLAN OF CARE
Problem: Labor (Cervical Ripen, Induct, Augment) (Adult,Obstetrics,Pediatric)  Goal: Signs and Symptoms of Listed Potential Problems Will be Absent, Minimized or Managed (Labor)  Signs and symptoms of listed potential problems will be absent, minimized or managed by discharge/transition of care (reference Labor (Cervical Ripen, Induct, Augment) (Adult,Obstetrics,Pediatric) CPG).   Client called nursing in room as was feeling rectal pressure. Cervix complete at 1849and head at 0 station. Repositioned client to RL with peanut ball and will labor down; client comfortable with the process and encourage rest.

## 2017-11-25 NOTE — PROGRESS NOTES
[facility]    Post-Partum Progress Note    Assessment & Plan   Assessment:  Post-partum day #1  Induced vaginal delivery secondary to post-dates    Doing well.  No excessive bleeding  Pain well-controlled.    Plan:  Ambulation encouraged  Breast feeding strategies discussed  Pain control measures as needed  Reportable signs and symptoms dicussed with the patient  Anticipate discharge tomorrow    Evi Oconnor Mai     Interval History   Doing well.  Pain is well-controlled.  No fever.  No foul-smelling vaginal discharge.  Good appetite.  Denies chest pain, shortness of breath, nausea or vomiting.  Vaginal bleeding is similar to a heavy menstrual flow.  Ambulatory.  Breastfeeding well. No concern from her  nor nursing staffs.    Medications     oxytocin in 0.9% NaCl       oxytocin in 0.9% NaCl       NO Rho (D) immune globulin (RhoGam) needed - mother Rh POSITIVE       - MEDICATION INSTRUCTIONS -         prenatal multivitamin plus iron  1 tablet Oral Daily       Physical Exam   Temp: 97.8  F (36.6  C) Temp src: Oral BP: 117/73 Pulse: 63   Resp: 16 SpO2: 97 % O2 Device: None (Room air)    There were no vitals filed for this visit.  Vital Signs with Ranges  Temp:  [97.7  F (36.5  C)-98.8  F (37.1  C)] 97.8  F (36.6  C)  Pulse:  [58-63] 63  Resp:  [16-20] 16  BP: ()/(54-88) 117/73  SpO2:  [82 %-100 %] 97 %  I/O last 3 completed shifts:  In: -   Out: 810 [Urine:500; Blood:310]    Uterine fundus is firm, non-tender and at the level of the umbilicus  Extremities Non-tender, no swelling  Heart is regular rate and rhythm and lungs clear to auscultation  Constitutional:   awake, alert, cooperative, no apparent distress, and appears stated age     Neurologic:   Awake, alert, oriented to name, place and time.  DTR +2 bilaterally at the knees .           Data   Recent Labs   Lab Test  11/24/17   0730   ABO  B   RH  Pos     Recent Labs   Lab Test  11/25/17   0625   HGB  11.3*     No lab  results found.

## 2017-11-25 NOTE — PROGRESS NOTES
S: Transfer to postpartum  B: Vaginal birth @ 2233, no tear, no repair, breast feeding    A: Mother and baby transferred to postpartum unit at 0230 via wheelchair after completion of immediate recovery period. Patient oriented to room. Mother and baby bonding well and in satisfactory condition upon transfer.  R: Anticipate routine postpartum care.

## 2017-11-25 NOTE — L&D DELIVERY NOTE
Delivery Summary    Ann Cortes MRN# [id number]   Age: 27 year old YOB: 1990     ASSESSMENT & PLAN:     VAGINAL DELIVERY NOTE:    STAGE 1:    Ann is a 27 year old female with  at 41w0d, who was admitted to the hospital on 2017 at around 7:30 am for induction secondary to post term. Routine intrapartum care was initiated.  She was induced with Pitocin per protocol.  Fetal heart tone monitoring was continuous and was reassuring through the intrapartum period.  Vital signs were stable.  She is group B strep was positive and she had adequate antibiotic treatment before SROM.  Amniotic fluid was bloody upon rupture, but cleared up later.  Her membranes were SROM at 14:30 on 17.  She received epidural for pain with good effect.  Cervix was completely dilated at 18:49 on 17.    STAGE 2:    Ann delivered a healthy male  at 22:33 on 17.  Fetal position was LYDIA with vertex presentation.  Nuchal cord was present x1.  There were no problems with delivery of the head, shoulders, or body.  After the body was delivered, the mouth was cleaned with gauss and bulb suctioned.  The  was placed on mom's tummy with nurses available to help dry and stimulate the .  The cord was clamped and then cut by FOB after 1 minute cord delay.  Apgar scores were 9 at 1 minute, 9 at 5 minutes.  Birthweight was 7 # 13 Oz.  The patient had no lacerations.    STAGE 3:    An intact placenta with 3-vessel cord delivered spontaneously at 10:37 on 17.  The placenta looks normal.  After the placenta was delivered, fundal massage was performed aggressively.  Thirty units of Pitocin was added to the existing IV bag and infused at a bolus rate.  The patient had good hemostasis.  Estimated blood loss was about 150 ml.      All needles, gauss and instruments were counted correctly.    Ann was updated about the procedure.  Routine postpartum care was initiated.  My  congratulations are to the Ann and her  who was at the bedside during delivery.            Labor Event Times    Labor onset date:  17 Onset time:   2:52 PM   Dilation complete date:  17 Complete time:   6:49 PM   Start pushing date/time:  2017            Labor Length    1st Stage (hrs):  3 (min):  57   2nd Stage (hrs):  3 (min):  44   3rd Stage (hrs):  0 (min):  4      Labor Events     labor?:  No    steroids:  None   Labor Type:  Induction      Antibiotics received during labor?:  Yes      Rupture identifier:  Rupture 1   Rupture date/time: 17 1440   Rupture type:  Spontaneous rupture of membranes occuring during spontaneous labor or augmentation   Fluid color:  Clear      Induction:  Oxytocin   Induction date/time:     Cervical ripening date/time:     Indications for induction:  Post-term Gestation      1:1 continuous labor support provided by?:  RN Labor partogram used?:  no         Delivery/Placenta Date and Time    Delivery Date:  17 Delivery Time:  10:33 PM   Placenta Date/Time:  2017 10:37 PM   Oxytocin given at the time of delivery:  after delivery of placenta      Vaginal Counts    Initial count performed by 2 team members:   Two Team Members   Dr. jyothi Lim          Needles Suture [place] Sponges Instruments   Initial counts 2  5    Added to count       Final counts 2  5       Placed during labor Accounted for at the end of labor   No NA   No NA   No NA      Final count performed by 2 team members:   Two Team Members   Dr. Jyothi Lim         Final count correct?:  Yes         Apgars    Living status:  Living    1 Minute 5 Minute 10 Minute 15 Minute 20 Minute   Skin color: 1  1       Heart rate: 2  2       Reflex irritability: 2  2       Muscle tone: 2  2       Respiratory effort: 2  2       Total: 9  9          Apgars assigned by:  KYLE [email or web address].[email or web address]      Cord    Vessels:  3 Vessels Complications:  Nuchal   Cord Blood Disposition:   "Lab Gases Sent?:  No         [place] Resuscitation    Methods:  None         [place] Measurements    Weight:  7 lb 13 oz Length:  1' 9\"   Head circumference:  35.6 cm Chest circumference:  33 cm      Skin to Skin and Feeding Plan    Skin to skin initiation date/time: 17   Skin to skin with:  Mother   Skin to skin end date/time: 17      Breastfeeding initiated date/time:  2017   How do you plan to feed your baby:  Breastfeeding      Labor Events and Shoulder Dystocia    Fetal Tracing Prior to Delivery:  Category 2   Shoulder dystocia present?:  Neg            Delivery (Maternal) (Provider to Complete) (915634)    Episiotomy:  None   Perineal lacerations:  None          Mother's Information  Mother: Ann Cortes #[id number]    Start of Mother's Information     IO Blood Loss  17 1452 - 17 2303    Mom's I/O Activity            End of Mother's Information  Mother: Ann Cortes #[id number]            Delivery - Provider to Complete ([id number])    Delivering clinician:  HARMONY ROE   Attempted Delivery Types (Choose all that apply):  Spontaneous Vaginal Delivery   Delivery Type (Choose the 1 that will go to the Birth History):  Vaginal, Spontaneous Delivery                     Other personnel:   Provider Role   KYLE LIM Registered Nurse   CARLITOS MARIE Medical Student   RICHARD ELIZALDE Registered Nurse            Placenta    Delayed Cord Clamping:  Done   Date/Time:  2017 10:37 PM   Removal:  Spontaneous   Disposition:  Hospital disposal      Anesthesia    Method:  Epidural   Cervical dilation at placement:  4-7         Presentation and Position    Presentation:  Vertex   Position:  Left Occiput Anterior                    Patient was seen and examined by myself and Dr. Roe.  The note was then scribed by me.     Carlitos aMrie, MS3      "

## 2017-11-26 VITALS
TEMPERATURE: 97.6 F | DIASTOLIC BLOOD PRESSURE: 78 MMHG | HEART RATE: 65 BPM | OXYGEN SATURATION: 97 % | SYSTOLIC BLOOD PRESSURE: 126 MMHG | RESPIRATION RATE: 16 BRPM

## 2017-11-26 PROCEDURE — 25000132 ZZH RX MED GY IP 250 OP 250 PS 637: Performed by: FAMILY MEDICINE

## 2017-11-26 RX ORDER — IBUPROFEN 800 MG/1
800 TABLET, FILM COATED ORAL EVERY 8 HOURS PRN
Qty: 90 TABLET | Refills: 0 | Status: SHIPPED | OUTPATIENT
Start: 2017-11-26 | End: 2018-01-29

## 2017-11-26 RX ADMIN — ACETAMINOPHEN 650 MG: 325 TABLET ORAL at 00:59

## 2017-11-26 RX ADMIN — IBUPROFEN 600 MG: 600 TABLET ORAL at 00:59

## 2017-11-26 RX ADMIN — SENNOSIDES AND DOCUSATE SODIUM 2 TABLET: 8.6; 5 TABLET ORAL at 09:28

## 2017-11-26 RX ADMIN — ACETAMINOPHEN 650 MG: 325 TABLET ORAL at 06:12

## 2017-11-26 RX ADMIN — PRENATAL VIT W/ FE FUMARATE-FA TAB 27-0.8 MG 1 TABLET: 27-0.8 TAB at 09:27

## 2017-11-26 NOTE — DISCHARGE SUMMARY
[facility] Discharge Summary    Ann Cortes MRN# [id number]   Age: 27 year old YOB: 1990     Date of Admission:  11/24/2017  Date of Discharge::  11/26/2017  Admitting Physician:  Dereck Conrad MD  Discharge Physician:  Evi Oconnor Mai, MD     Home clinic: [facility]          Admission Diagnoses:   Post-dates pregnancy          Discharge Diagnosis:   Normal spontaneous vaginal delivery  Intrauterine pregnancy at 41 weeks gestation          Procedures:   Procedure(s): No additional procedures performed               Medications Prior to Admission:     Prescriptions Prior to Admission   Medication Sig Dispense Refill Last Dose     Esomeprazole Magnesium (NEXIUM PO) Take 20 mg by mouth every morning (before breakfast)   11/23/2017 at 2100     Prenatal Vit-Fe Fumarate-FA (PRENATAL MULTIVITAMIN PLUS IRON) 27-0.8 MG TABS per tablet Take 1 tablet by mouth daily   11/23/2017 at 2100             Discharge Medications:     Current Discharge Medication List      START taking these medications    Details   ibuprofen (ADVIL/MOTRIN) 800 MG tablet Take 1 tablet (800 mg) by mouth every 8 hours as needed for other (cramping)  Qty: 90 tablet, Refills: 0    Associated Diagnoses: Vaginal delivery      order for DME Equipment being ordered: Breast pump and its supplies  Qty: 1 Units, Refills: 0    Associated Diagnoses: Post-term pregnancy, 40-42 weeks of gestation; Vaginal delivery         CONTINUE these medications which have NOT CHANGED    Details   Esomeprazole Magnesium (NEXIUM PO) Take 20 mg by mouth every morning (before breakfast)      Prenatal Vit-Fe Fumarate-FA (PRENATAL MULTIVITAMIN PLUS IRON) 27-0.8 MG TABS per tablet Take 1 tablet by mouth daily                   Consultations:   No consultations were requested during this admission          Brief History of Labor:   Ann was admitted for elective induction due to post date.  Routine intrapartum care initiated and she was  induced with pitocin per protocol.  GBS positive with adequate antibiotic treatment.  Labor progressed as expected.  Delivered vaginally with no problem or complication. No perineal or vaginal laceration.           Hospital Course:   The patient's hospital course was unremarkable.  On discharge, her pain was well controlled. Vaginal bleeding is similar to peak menstrual flow.  Voiding without difficulty.  Ambulating well and tolerating a normal diet.  No fever.  Breastfeeding well.  Infant is stable.  Normal bowel movement.  She was discharged on post-partum day #2.      At the time of discharge, overall she is doing well and is ready to go home. No specific concerns today. No headache or dizziness. No chest pain or SOB. Tolerating PO intake well and has a good appetite. Pain is control with Ibuprofen. Has been ambulating and reports no problems. No of leg swelling. Normal bleeding. No depression. No concerns from nursing staff.   Vital signs reviewed and they were normal and stable   General: Alert, pleasant, no acute distress.   Lungs: Clear   Heart: RRR, no murmur.   Abd: Soft, non-distended. Non-tender with palpation to the lower abdomen. Fundus is firmed.   Ext: Legs with no edema.   LAB reviewed.     Post-partum with vaginal delivery day # 2. Doing well. D/C home today. Post-partum care discussed and educated her about symptoms that need to be seen for or called in clinic. Birth control options also discussed. Post-partum depression symptoms and its prevention were also discussed. Encourage to keep up with the breastfeeding. Follow up in 6 weeks with Dr. Conrad for her post- partum visit. Encouraged to call the clinic if has any questions or concerns. All questions answered.    Post-partum hemoglobin:   Hemoglobin   Date Value Ref Range Status   11/25/2017 11.3 (L) 11.7 - 15.7 g/dL Final             Discharge Instructions and Follow-Up:   Discharge diet: Regular   Discharge activity: Pelvic rest: abstain from  intercourse and do not use tampons for 6 week(s)   Discharge follow-up: Follow up with primary care provider in 6 weeks   Wound care: Drink plenty of fluids           Discharge Disposition:   Discharged to home      Attestation:  I have reviewed today's vital signs, notes, medications, labs and imaging.    Evi Oconnor Mai, MD

## 2017-11-26 NOTE — PROGRESS NOTES
Discharge instructions given. Following pathway. See previous discharge note. Ambulated off unit at 1355.

## 2017-11-26 NOTE — PROGRESS NOTES
S-(situation): Discharge  to home    B-(background): Patient had a Vaginal delivery with no complications. Baby boy Baby's name daisy, breast: . Support person's name Zachary    A-(assessment): d/c criteria met.  Dr to see patient later today    R-(recommendations): To call or return with questions or troubles Discharge instructions reviewed and questions answered.  Belongings gathered and returned to the patient. Agreed to follow up in 6 weeks or sooner with any question or concerns.     Nursing Discharge Checklist:    Pneumovax screened and given, if appropriate: N/A  Influenza vaccine screened and given, if appropriate: N/A  Staples removed (): N/A  Breast milk returned: YES  Hydrogel pads sent home:N/A  Birth Certificate Done: YES

## 2017-11-26 NOTE — DISCHARGE INSTRUCTIONS
Postpartum Vaginal Delivery Instructions    Activity       Ask family and friends for help when you need it.    Do not place anything in your vagina for 6 weeks.    You are not restricted on other activities, but take it easy for a few weeks to allow your body to recover from delivery.  You are able to do any activities you feel up to that point.    No driving until you have stopped taking your pain medications (usually two weeks after delivery).     Call your health care provider if you have any of these symptoms:       Increased pain, swelling, redness, or fluid around your stiches from an episiotomy or perineal tear.    A fever above 100.4 F (38 C) with or without chills when placing a thermometer under your tongue.    You soak a sanitary pad with blood within 1 hour, or you see blood clots larger than a golf ball.    Bleeding that lasts more than 6 weeks.    Vaginal discharge that smells bad.    Severe pain, cramping or tenderness in your lower belly area.    A need to urinate more frequently (use the toilet more often), more urgently (use the toilet very quickly), or it burns when you urinate.    Nausea and vomiting.    Redness, swelling or pain around a vein in your leg.    Problems breastfeeding or a red or painful area on your breast.    Chest pain and cough or are gasping for air.    Problems coping with sadness, anxiety, or depression.  If you have any concerns about hurting yourself or the baby, call your provider immediately.     You have questions or concerns after you return home.     Keep your hands clean:  Always wash your hands before touching your perineal area and stitches.  This helps reduce your risk of infection.  If your hands aren't dirty, you may use an alcohol hand-rub to clean your hands. Keep your nails clean and short.        [facility] Discharge Instructions     Discharge disposition:  Discharged to home       Diet:  Regular       Activity Pelvic rest: abstain  from intercourse and do not use tampons for 6 week(s). Advance activities as tolerated       Follow-up: Follow up with primary care provider in 6 weeks       Additional instructions: Thank you for choosing [facility] for your OB care. It was a pleasure to meet you and your family.  It also was an honor for us at [facility] to be one of the first ones to welcome your handsome and adorable boy into this world. Congratulations and again thank you.  Dr. Roe        Additional Patient Information:  Please give us a call if you have any concern or questions.

## 2017-11-26 NOTE — PLAN OF CARE
Problem: Postpartum (Vaginal Delivery) (Adult,Obstetrics,Pediatric)  Goal: Signs and Symptoms of Listed Potential Problems Will be Absent, Minimized or Managed (Postpartum)  Signs and symptoms of listed potential problems will be absent, minimized or managed by discharge/transition of care (reference Postpartum (Vaginal Delivery) (Adult,Obstetrics,Pediatric) CPG).   Outcome: Improving  Using po prn meds to help prevent cramping while feeding. Independent with self cares and  cares. Plans on discharge today. Some teaching left to do including watching videos. Encouraged rest tonight in between feedings

## 2017-11-26 NOTE — PLAN OF CARE
Problem: Postpartum (Vaginal Delivery) (Adult,Obstetrics,Pediatric)  Goal: Signs and Symptoms of Listed Potential Problems Will be Absent, Minimized or Managed (Postpartum)  Signs and symptoms of listed potential problems will be absent, minimized or managed by discharge/transition of care (reference Postpartum (Vaginal Delivery) (Adult,Obstetrics,Pediatric) CPG).   Outcome: Improving  S: Shift review; [id number]  B:Ann is a  who delivered vaginally on 2017 after pitocin induction at 41 weeks  A: VSS, patient is independent with mobility, pain well controlled with p.o. pain meds. Handles baby with confidence. Brstfdg more independently now.   R: Continue with routine PP care. Videos in room to watch with  prior to discharge.

## 2017-11-27 NOTE — ANESTHESIA POSTPROCEDURE EVALUATION
Patient: Ann Cortes    * No procedures listed *    Diagnosis:* No pre-op diagnosis entered *  Diagnosis Additional Information: No value filed.    Anesthesia Type:  No value filed.    Note:  Anesthesia Post Evaluation    Patient location: pt was discharged.  Attempted to call her and she answered the second call day.  Patient participation: Able to fully participate in evaluation  Level of consciousness: awake  Pain management: adequate  Airway patency: patent  Cardiovascular status: acceptable and hemodynamically stable  Respiratory status: acceptable, room air and nonlabored ventilation  Hydration status: stable  PONV: none     Anesthetic complications: None    Comments: Patient was happy with the anesthesia care received and no anesthesia related complications were noted.  I advised her she would have some pain in her back.  She had a little boy and both are doing well.  I will follow up with the patient again if it is needed.        Last vitals:  There were no vitals filed for this visit.      Electronically Signed By: SHINE Oleary CRNA  November 26, 2017  7:20 PM

## 2017-11-30 NOTE — PROGRESS NOTES
Ann Navarroon  Gender: female  : 1990  [address]  [place] [address]  [phone] (home)   Medical Record: [id number]  Primary Care Provider: Dereck Conrad       [facility]   ?   Discharge Phone Call: Key Words/Key Times     How are you and the baby?     How are feedings going?     Voiding & Stooling?     Any questions or concerns?     Follow-up appointment?       We want to provide excellent care here at [facility]. Do you have any feedback for us that would help us improve?     Call back COMMENTS:         Attempted Calls:   ____ LM_____     __________

## 2018-01-29 ENCOUNTER — PRENATAL OFFICE VISIT (OUTPATIENT)
Dept: FAMILY MEDICINE | Facility: CLINIC | Age: 28
End: 2018-01-29
Payer: COMMERCIAL

## 2018-01-29 VITALS
SYSTOLIC BLOOD PRESSURE: 116 MMHG | RESPIRATION RATE: 14 BRPM | DIASTOLIC BLOOD PRESSURE: 74 MMHG | BODY MASS INDEX: 32.03 KG/M2 | OXYGEN SATURATION: 98 % | HEART RATE: 55 BPM | WEIGHT: 192.25 LBS | TEMPERATURE: 97.8 F | HEIGHT: 65 IN

## 2018-01-29 DIAGNOSIS — Z13.6 CARDIOVASCULAR SCREENING; LDL GOAL LESS THAN 160: ICD-10-CM

## 2018-01-29 PROBLEM — O48.0 POST-DATES PREGNANCY: Status: RESOLVED | Noted: 2017-11-24 | Resolved: 2018-01-29

## 2018-01-29 PROBLEM — Z34.00 SUPERVISION OF NORMAL FIRST PREGNANCY: Status: RESOLVED | Noted: 2017-10-02 | Resolved: 2018-01-29

## 2018-01-29 PROCEDURE — 99207 ZZC POST PARTUM EXAM: CPT | Performed by: FAMILY MEDICINE

## 2018-01-29 ASSESSMENT — PAIN SCALES - GENERAL: PAINLEVEL: NO PAIN (0)

## 2018-01-29 NOTE — MR AVS SNAPSHOT
"              After Visit Summary   1/29/2018    Ann Cortes    MRN: [id number]           Patient Information     Date Of Birth          1990        Visit Information        Provider Department      1/29/2018 4:30 PM Dereck Conrad MD [facility]        Today's Diagnoses     Routine postpartum follow-up    -  1    CARDIOVASCULAR SCREENING; LDL GOAL LESS THAN 160           Follow-ups after your visit        Who to contact     If you have questions or need follow up information about today's clinic visit or your schedule please contact [facility] directly at [phone].  Normal or non-critical lab and imaging results will be communicated to you by [employer], letter or phone within 4 business days after the clinic has received the results. If you do not hear from us within 7 days, please contact the clinic through [employer] or phone. If you have a critical or abnormal lab result, we will notify you by phone as soon as possible.  Submit refill requests through [employer] or call your pharmacy and they will forward the refill request to us. Please allow 3 business days for your refill to be completed.          Additional Information About Your Visit        MyChart Information     [employer] gives you secure access to your electronic health record. If you see a primary care provider, you can also send messages to your care team and make appointments. If you have questions, please call your primary care clinic.  If you do not have a primary care provider, please call [phone] and they will assist you.        Care EveryWhere ID     This is your Care EveryWhere ID. This could be used by other organizations to access your [facility] medical records  [id number]        Your Vitals Were     Pulse Temperature Respirations Height Last Period Pulse Oximetry    55 97.8  F (36.6  C) (Tympanic) 14 5' 4.6\" (1.641 m) 02/10/2017 (Exact Date) 98%    BMI (Body Mass Index)                   32.39 " kg/m2            Blood Pressure from Last 3 Encounters:   01/29/18 116/74   11/26/17 126/78   11/20/17 118/82    Weight from Last 3 Encounters:   01/29/18 192 lb 4 oz (87.2 kg)   11/20/17 202 lb 2 oz (91.7 kg)   11/13/17 202 lb (91.6 kg)              Today, you had the following     No orders found for display       Primary Care Provider Office Phone # Fax #    Dereck Conrad [phone] [address] [place] [address]        Equal Access to Services     [facility]: Hadii gio michel hadasho Soomaali, waaxda luqadaha, qaybta kaalmada adeanatoliy, burt rich . So [facility] [phone].    ATENCIÓN: Si habla español, tiene a manuel disposición servicios gratuitos de asistencia lingüística. LlAC[facility] [phone].    We comply with applicable federal civil rights laws and [place] laws. We do not discriminate on the basis of race, color, national origin, age, disability, sex, sexual orientation, or gender identity.            Thank you!     Thank you for choosing [facility]  for your care. Our goal is always to provide you with excellent care. Hearing back from our patients is one way we can continue to improve our services. Please take a few minutes to complete the written survey that you may receive in the mail after your visit with us. Thank you!             Your Updated Medication List - Protect others around you: Learn how to safely use, store and throw away your medicines at www.disposemymeds.org.          This list is accurate as of 1/29/18  5:30 PM.  Always use your most recent med list.                   Brand Name Dispense Instructions for use Diagnosis    NEXIUM PO      Take 20 mg by mouth every morning (before breakfast)        order for DME     1 Units    Equipment being ordered: Breast pump and its supplies    Post-term pregnancy, 40-42 weeks of gestation, Vaginal delivery       prenatal multivitamin plus iron 27-0.8 MG Tabs per tablet       Take 1 tablet by mouth daily

## 2018-01-29 NOTE — NURSING NOTE
"Chief Complaint   Patient presents with     Post Partum Exam       Initial /74 (BP Location: Right arm, Patient Position: Chair, Cuff Size: Adult Regular)  Pulse 55  Temp 97.8  F (36.6  C) (Tympanic)  Resp 14  Ht 5' 4.6\" (1.641 m)  Wt 192 lb 4 oz (87.2 kg)  LMP 02/10/2017 (Exact Date)  SpO2 98%  BMI 32.39 kg/m2 Estimated body mass index is 32.39 kg/(m^2) as calculated from the following:    Height as of this encounter: 5' 4.6\" (1.641 m).    Weight as of this encounter: 192 lb 4 oz (87.2 kg).  Medication Reconciliation: complete   There are no preventive care reminders to display for this patient.  Orquidea Faye, [facility]      "

## 2018-01-29 NOTE — PROGRESS NOTES
"  Ann is here for a 6-week postpartum checkup.    She had a  of a viable boy, weight 7 pounds 13 oz., with none complications. Date of delivery was 17. Since delivery, she has been breast feeding.  She has no signs of infection, bleeding or other complications.  She is not pregnant.  We discussed contraceptions and she has chosen natural family planning and using condoms right now.      Post partum tubal: No  History of Gestational Diabetes? No  Type of Delivery:  Vaginal  Feeding Method:  Breast  If initiated breast feeding and stopped, how long did you breast feed?:      REVIEW OF SYSTEMS:  Ears/Nose/Throat: negative  Respiratory: negative  Cardiovascular: negative  Gastrointestinal: negative  Genitourinary: negative  Musculoskeletal: negative    Neurologic: negative   Skin: negative   Endocrine:  negative  Vagina: negative  Cervix: negative  Breasts: negative  Vulva: negative  Episiotomy: negative    Contraception Plan: natural family planning    Medical History Reviewed yes -   Family History Reviewed yes -   Problem List Updated yes -     EXAM:  /74 (BP Location: Right arm, Patient Position: Chair, Cuff Size: Adult Regular)  Pulse 55  Temp 97.8  F (36.6  C) (Tympanic)  Resp 14  Ht 5' 4.6\" (1.641 m)  Wt 192 lb 4 oz (87.2 kg)  LMP 02/10/2017 (Exact Date)  SpO2 98%  BMI 32.39 kg/m2  HEENT: grossly normal.  NECK: no lymphadenopathy or thyroidomegaly.  LUNGS: CTA X 2, no rales or crackles.  BACK: No spinal or CVA tenderness.  HEART: RRR without murmurs clicks or gallops.    EXTREMITIES:  warm to touch, good pulses, no ankle edema or calf tenderness.  NEUROLOGIC: grossly normal.    ASSESSMENT:   6-week postpartum exam after .    PLAN:    Exercise encouraged  Weight loss recommended.  One-hour glucose tolerance test needed? No  natural family planning for contraception       Dereck Conrad MD  .    "

## 2018-04-23 ENCOUNTER — OFFICE VISIT (OUTPATIENT)
Dept: URGENT CARE | Facility: RETAIL CLINIC | Age: 28
End: 2018-04-23
Payer: COMMERCIAL

## 2018-04-23 VITALS
OXYGEN SATURATION: 100 % | DIASTOLIC BLOOD PRESSURE: 91 MMHG | SYSTOLIC BLOOD PRESSURE: 149 MMHG | TEMPERATURE: 101.5 F | HEART RATE: 130 BPM

## 2018-04-23 DIAGNOSIS — B34.9 VIRAL SYNDROME: ICD-10-CM

## 2018-04-23 DIAGNOSIS — J02.9 ACUTE PHARYNGITIS, UNSPECIFIED ETIOLOGY: Primary | ICD-10-CM

## 2018-04-23 LAB
FLUAV AG UPPER RESP QL IA.RAPID: NORMAL
FLUBV AG UPPER RESP QL IA.RAPID: NORMAL
S PYO AG THROAT QL IA.RAPID: NORMAL

## 2018-04-23 PROCEDURE — 87880 STREP A ASSAY W/OPTIC: CPT | Mod: QW | Performed by: FAMILY MEDICINE

## 2018-04-23 PROCEDURE — 87804 INFLUENZA ASSAY W/OPTIC: CPT | Mod: QW | Performed by: FAMILY MEDICINE

## 2018-04-23 PROCEDURE — 99213 OFFICE O/P EST LOW 20 MIN: CPT | Performed by: FAMILY MEDICINE

## 2018-04-23 PROCEDURE — 87081 CULTURE SCREEN ONLY: CPT | Performed by: FAMILY MEDICINE

## 2018-04-23 NOTE — LETTER
Return to  Work Release    Date: 4/23/2018      Name: Ann Cortes                       YOB: 1990      The patient was seen at [facility] today, and could be staying home for 2-3 days after for illness.    Restrictions if any: none    Resume Activity: With no limitations.        _________________________  Miguel Willoughby MD

## 2018-04-23 NOTE — NURSING NOTE
"Chief Complaint   Patient presents with     Cough     x 4 days, pt is nursing        Initial BP (!) 149/91  Pulse 130  Temp 101.5  F (38.6  C) (Oral)  SpO2 100% Estimated body mass index is 32.39 kg/(m^2) as calculated from the following:    Height as of 1/29/18: 5' 4.6\" (1.641 m).    Weight as of 1/29/18: 192 lb 4 oz (87.2 kg).  Medication Reconciliation: complete   Marie Solis      "

## 2018-04-23 NOTE — MR AVS SNAPSHOT
After Visit Summary   4/23/2018    Ann Cortes    MRN: [id number]           Patient Information     Date Of Birth          1990        Visit Information        Provider Department      4/23/2018 3:45 PM Miguel Willoughby MD [facility]        Today's Diagnoses     Acute pharyngitis, unspecified etiology    -  1    Viral syndrome           Follow-ups after your visit        Who to contact     You can reach your care team any time of the day by calling [phone].  Notification of test results:  If you have an abnormal lab result, we will notify you by phone as soon as possible.         Additional Information About Your Visit        MyChart Information     [employer] [employer] gives you secure access to your electronic health record. If you see a primary care provider, you can also send messages to your care team and make appointments. If you have questions, please call your primary care clinic.  If you do not have a primary care provider, please call [phone] and they will assist you.        Care EveryWhere ID     This is your Care EveryWhere ID. This could be used by other organizations to access your [facility] medical records  [id number]        Your Vitals Were     Pulse Temperature Pulse Oximetry             130 101.5  F (38.6  C) (Oral) 100%          Blood Pressure from Last 3 Encounters:   04/23/18 (!) 149/91   01/29/18 116/74   11/26/17 126/78    Weight from Last 3 Encounters:   01/29/18 192 lb 4 oz (87.2 kg)   11/20/17 202 lb 2 oz (91.7 kg)   11/13/17 202 lb (91.6 kg)              We Performed the Following     BETA STREP GROUP A R/O CULTURE     INFLUENZA A/B ANTIGEN     RAPID STREP SCREEN        Primary Care Provider Office Phone # Fax #    Dereck Conrad [phone] [address] [place] [address]        Equal Access to Services     JUNIOR RICCI : Luis Carlos Vance, fred reid, burt marcelino  wayneivanconrado davislatoya lexiewayne lastalinconrado ah. So [facility] [phone].    ATENCIÓN: Si habla robby, tiene a manuel disposición servicios gratuitos de asistencia lingüística. Jayne al [phone].    We comply with applicable federal civil rights laws and [place] laws. We do not discriminate on the basis of race, color, national origin, age, disability, sex, sexual orientation, or gender identity.            Thank you!     Thank you for choosing [facility]  for your care. Our goal is always to provide you with excellent care. Hearing back from our patients is one way we can continue to improve our services. Please take a few minutes to complete the written survey that you may receive in the mail after your visit with us. Thank you!             Your Updated Medication List - Protect others around you: Learn how to safely use, store and throw away your medicines at www.disposemymeds.org.          This list is accurate as of 4/23/18  7:12 PM.  Always use your most recent med list.                   Brand Name Dispense Instructions for use Diagnosis    NEXIUM PO      Take 20 mg by mouth every morning (before breakfast)        order for DME     1 Units    Equipment being ordered: Breast pump and its supplies    Post-term pregnancy, 40-42 weeks of gestation, Vaginal delivery       prenatal multivitamin plus iron 27-0.8 MG Tabs per tablet      Take 1 tablet by mouth daily

## 2018-04-24 NOTE — PROGRESS NOTES
SUBJECTIVE:  Ann Cortes is a 27 year old female who presents to the clinic today with a chief complaint of cough  for 4 day(s).  Her cough is described as persistent, daytime, nightime and nonproductive.    The patient's symptoms are moderate and worsening.  Associated symptoms include congestion, malaise, light headed and myalgias. The patient's symptoms are exacerbated by no particular triggers  Patient has been using Tylenol  to improve symptoms.  Nursing baby.  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    Past Medical History:   Diagnosis Date     NO ACTIVE PROBLEMS      Current Outpatient Prescriptions   Medication Sig Dispense Refill     Prenatal Vit-Fe Fumarate-FA (PRENATAL MULTIVITAMIN PLUS IRON) 27-0.8 MG TABS per tablet Take 1 tablet by mouth daily       Esomeprazole Magnesium (NEXIUM PO) Take 20 mg by mouth every morning (before breakfast)       order for DME Equipment being ordered: Breast pump and its supplies (Patient not taking: Reported on 4/23/2018) 1 Units 0     History   Smoking Status     Never Smoker   Smokeless Tobacco     Never Used     Comment:        ROS  BREAST: NEGATIVE for masses, tenderness or discharge  : negative for pain or discharge  OBJECTIVE:  BP (!) 149/91  Pulse 130  Temp 101.5  F (38.6  C) (Oral)  SpO2 100%  GENERAL APPEARANCE: mild distress, flushed and tired  EYES: EOMI,  PERRL, conjunctiva clear  HENT: ear canals and TM's normal.  Nose and mouth without ulcers, erythema or lesions  NECK: supple, nontender, no lymphadenopathy  RESP: lungs clear to auscultation - no rales, rhonchi or wheezes  CV: regular rates and rhythm, normal S1 S2, no murmur noted  ABDOMEN:  soft, nontender, no HSM or masses and bowel sounds normal  NEURO: Normal strength and tone, sensory exam grossly normal,  normal speech and mentation  SKIN: no suspicious lesions or rashes    ASSESSMENT:    Upper Respiratory Infection  Hypertension  Tachycardia  Negative for strep and influenza  A and B    PLAN:  Rest, no work for at least three days.  Symptomatic measures encouraged, humidified air, plenty of fluids.  Follow up with primary care provider if no improvement.

## 2018-04-25 LAB
BACTERIA SPEC CULT: NORMAL
SPECIMEN SOURCE: NORMAL

## 2018-07-18 ENCOUNTER — MYC MEDICAL ADVICE (OUTPATIENT)
Dept: FAMILY MEDICINE | Facility: CLINIC | Age: 28
End: 2018-07-18

## 2018-10-08 ENCOUNTER — MYC MEDICAL ADVICE (OUTPATIENT)
Dept: FAMILY MEDICINE | Facility: CLINIC | Age: 28
End: 2018-10-08

## 2018-10-08 DIAGNOSIS — H65.93 OME (OTITIS MEDIA WITH EFFUSION), BILATERAL: Primary | ICD-10-CM

## 2018-10-08 RX ORDER — AMOXICILLIN 500 MG/1
500 CAPSULE ORAL 3 TIMES DAILY
Qty: 30 CAPSULE | Refills: 0 | Status: SHIPPED | OUTPATIENT
Start: 2018-10-08 | End: 2018-10-26

## 2018-10-08 NOTE — TELEPHONE ENCOUNTER
Patient was given neomycin and polymixin B sulffate with hydrocortzone  4 times a day for 7 days and patient has done 6 doses. Patient went to [facility] care Please advise.  Gay Pace MA 10/8/2018

## 2018-10-09 NOTE — TELEPHONE ENCOUNTER
I have attempted to contact this patient by phone with the following results: no answer, vm full.  Will mychart.  Orquidea Faye, [facility]        Per RF- he gave her amoxicillin. [facility] [place]

## 2018-10-22 ENCOUNTER — MYC MEDICAL ADVICE (OUTPATIENT)
Dept: FAMILY MEDICINE | Facility: CLINIC | Age: 28
End: 2018-10-22

## 2018-10-26 ENCOUNTER — ALLIED HEALTH/NURSE VISIT (OUTPATIENT)
Dept: FAMILY MEDICINE | Facility: CLINIC | Age: 28
End: 2018-10-26
Payer: COMMERCIAL

## 2018-10-26 VITALS — BODY MASS INDEX: 33.86 KG/M2 | WEIGHT: 201 LBS

## 2018-10-26 DIAGNOSIS — N91.2 AMENORRHEA: Primary | ICD-10-CM

## 2018-10-26 LAB — HCG UR QL: POSITIVE

## 2018-10-26 PROCEDURE — 81025 URINE PREGNANCY TEST: CPT | Performed by: FAMILY MEDICINE

## 2018-10-26 PROCEDURE — 99207 ZZC NO CHARGE NURSE ONLY: CPT

## 2018-10-26 NOTE — NURSING NOTE
Ann Cortes is a 28 year old here today for a pregnancy test.  LMP: Patient's last menstrual period was 2018 (approximate).  Wt: 201 lbs 0 oz.    Symptoms include absence of menses and fatigue.    Ann informed of positive pregnancy test results. RAH: 2018    Educational advice given: nutrition, smoking and drugs & alcohol.    Current medications reviewed: Yes    Previous pregnancy history remarkable for: None.    Plan: schedule appointment with OB Educator and/or OB class, follow-up appointment with Dr. Conrad for pre-smiley care, take multivitamin or pre- vitamins and OB Education packet given.    She is to call back if she has any questions or concerns.  She is advised to notify a provider immediately if she experiences any severe cramping or abdominal pain or any vaginal bleeding.    Sivan Brody RN

## 2018-10-26 NOTE — MR AVS SNAPSHOT
After Visit Summary   10/26/2018    Ann Cortes    MRN: [id number]           Patient Information     Date Of Birth          1990        Visit Information        Provider Department      10/26/2018 2:00 PM PH NURSE [facility]        Today's Diagnoses     Amenorrhea    -  1       Follow-ups after your visit        Your next 10 appointments already scheduled     Oct 26, 2018  2:00 PM CDT   Nurse Only with PH NURSE   [facility] ([facility])    [address] [place] [id number] [phone]              Who to contact     If you have questions or need follow up information about today's clinic visit or your schedule please contact [facility] directly at [phone].  Normal or non-critical lab and imaging results will be communicated to you by EQUISOhart, letter or phone within 4 business days after the clinic has received the results. If you do not hear from us within 7 days, please contact the clinic through EQUISOhart or phone. If you have a critical or abnormal lab result, we will notify you by phone as soon as possible.  Submit refill requests through [employer]NorthLog[employer] or call your pharmacy and they will forward the refill request to us. Please allow 3 business days for your refill to be completed.          Additional Information About Your Visit        MyChart Information     [employer] gives you secure access to your electronic health record. If you see a primary care provider, you can also send messages to your care team and make appointments. If you have questions, please call your primary care clinic.  If you do not have a primary care provider, please call [phone] and they will assist you.        Care EveryWhere ID     This is your Care EveryWhere ID. This could be used by other organizations to access your [facility] medical records  [id number]        Your Vitals Were     Last Period Breastfeeding? BMI (Body Mass  Index)             09/23/2018 (Approximate) No 33.86 kg/m2          Blood Pressure from Last 3 Encounters:   04/23/18 (!) 149/91   01/29/18 116/74   11/26/17 126/78    Weight from Last 3 Encounters:   10/26/18 201 lb (91.2 kg)   01/29/18 192 lb 4 oz (87.2 kg)   11/20/17 202 lb 2 oz (91.7 kg)              We Performed the Following     HCG qualitative urine        Primary Care Provider Office Phone # Fax #    Dereck Conrad [phone] [address] [place] [address]        Equal Access to Services     [facility]: Hadii gio michel hadasho Sokallieali, waaxda luqadaha, qaybta kaalmada adelatoyayada, burt rich . So [facility] [phone].    ATENCIÓN: Si habla español, tiene a manuel disposición servicios gratuitos de asistencia lingüística. Llame al [phone].    We comply with applicable federal civil rights laws and [place] laws. We do not discriminate on the basis of race, color, national origin, age, disability, sex, sexual orientation, or gender identity.            Thank you!     Thank you for choosing [facility]  for your care. Our goal is always to provide you with excellent care. Hearing back from our patients is one way we can continue to improve our services. Please take a few minutes to complete the written survey that you may receive in the mail after your visit with us. Thank you!             Your Updated Medication List - Protect others around you: Learn how to safely use, store and throw away your medicines at www.disposemymeds.org.          This list is accurate as of 10/26/18  1:42 PM.  Always use your most recent med list.                   Brand Name Dispense Instructions for use Diagnosis    NEXIUM PO      Take 20 mg by mouth every morning (before breakfast)        order for DME     1 Units    Equipment being ordered: Breast pump and its supplies    Post-term pregnancy, 40-42 weeks of gestation, Vaginal delivery       prenatal  multivitamin plus iron 27-0.8 MG Tabs per tablet      Take 1 tablet by mouth daily

## 2018-10-29 ENCOUNTER — PRENATAL OFFICE VISIT (OUTPATIENT)
Dept: FAMILY MEDICINE | Facility: CLINIC | Age: 28
End: 2018-10-29
Payer: COMMERCIAL

## 2018-10-29 VITALS — WEIGHT: 200 LBS | HEIGHT: 67 IN | BODY MASS INDEX: 31.39 KG/M2

## 2018-10-29 DIAGNOSIS — Z34.90 SUPERVISION OF NORMAL PREGNANCY: Primary | ICD-10-CM

## 2018-10-29 LAB
ABO + RH BLD: NORMAL
ABO + RH BLD: NORMAL
ALBUMIN UR-MCNC: NEGATIVE MG/DL
APPEARANCE UR: ABNORMAL
BACTERIA #/AREA URNS HPF: ABNORMAL /HPF
BILIRUB UR QL STRIP: NEGATIVE
BLD GP AB SCN SERPL QL: NORMAL
BLOOD BANK CMNT PATIENT-IMP: NORMAL
COLOR UR AUTO: YELLOW
ERYTHROCYTE [DISTWIDTH] IN BLOOD BY AUTOMATED COUNT: 12.2 % (ref 10–15)
GLUCOSE UR STRIP-MCNC: NEGATIVE MG/DL
HCT VFR BLD AUTO: 41.3 % (ref 35–47)
HGB BLD-MCNC: 13.5 G/DL (ref 11.7–15.7)
HGB UR QL STRIP: NEGATIVE
KETONES UR STRIP-MCNC: 5 MG/DL
LEUKOCYTE ESTERASE UR QL STRIP: ABNORMAL
MCH RBC QN AUTO: 29.5 PG (ref 26.5–33)
MCHC RBC AUTO-ENTMCNC: 32.7 G/DL (ref 31.5–36.5)
MCV RBC AUTO: 90 FL (ref 78–100)
MUCOUS THREADS #/AREA URNS LPF: PRESENT /LPF
NITRATE UR QL: NEGATIVE
PH UR STRIP: 5 PH (ref 5–7)
PLATELET # BLD AUTO: 408 10E9/L (ref 150–450)
RBC # BLD AUTO: 4.57 10E12/L (ref 3.8–5.2)
RBC #/AREA URNS AUTO: 1 /HPF (ref 0–2)
SOURCE: ABNORMAL
SP GR UR STRIP: 1.03 (ref 1–1.03)
SPECIMEN EXP DATE BLD: NORMAL
SQUAMOUS #/AREA URNS AUTO: 11 /HPF (ref 0–1)
UROBILINOGEN UR STRIP-MCNC: 2 MG/DL (ref 0–2)
WBC # BLD AUTO: 11.2 10E9/L (ref 4–11)
WBC #/AREA URNS AUTO: 11 /HPF (ref 0–5)

## 2018-10-29 PROCEDURE — 87086 URINE CULTURE/COLONY COUNT: CPT | Performed by: FAMILY MEDICINE

## 2018-10-29 PROCEDURE — 85027 COMPLETE CBC AUTOMATED: CPT | Performed by: FAMILY MEDICINE

## 2018-10-29 PROCEDURE — 99207 ZZC NO CHARGE NURSE ONLY: CPT

## 2018-10-29 PROCEDURE — 87340 HEPATITIS B SURFACE AG IA: CPT | Performed by: FAMILY MEDICINE

## 2018-10-29 PROCEDURE — 86900 BLOOD TYPING SEROLOGIC ABO: CPT | Performed by: FAMILY MEDICINE

## 2018-10-29 PROCEDURE — 86901 BLOOD TYPING SEROLOGIC RH(D): CPT | Performed by: FAMILY MEDICINE

## 2018-10-29 PROCEDURE — 86780 TREPONEMA PALLIDUM: CPT | Performed by: FAMILY MEDICINE

## 2018-10-29 PROCEDURE — 86762 RUBELLA ANTIBODY: CPT | Performed by: FAMILY MEDICINE

## 2018-10-29 PROCEDURE — 81001 URINALYSIS AUTO W/SCOPE: CPT | Performed by: FAMILY MEDICINE

## 2018-10-29 PROCEDURE — 86850 RBC ANTIBODY SCREEN: CPT | Performed by: FAMILY MEDICINE

## 2018-10-29 PROCEDURE — 36415 COLL VENOUS BLD VENIPUNCTURE: CPT | Performed by: FAMILY MEDICINE

## 2018-10-29 PROCEDURE — 87389 HIV-1 AG W/HIV-1&-2 AB AG IA: CPT | Performed by: FAMILY MEDICINE

## 2018-10-29 NOTE — NURSING NOTE
5w1d  Pt here for OB intake visit. Folder reviewed and POC reviewed and all questions answered. Pt will get labs done today and will get ultrasound done around 9 weeks and see Dr Conrad around 10-12 weeks.  Ramnó Rebolledo, JUSTINN, RN

## 2018-10-29 NOTE — MR AVS SNAPSHOT
After Visit Summary   10/29/2018    Ann Cortes    MRN: [id number]           Patient Information     Date Of Birth          1990        Visit Information        Provider Department      10/29/2018 2:00 PM NL OB INTAKE [facility]        Today's Diagnoses     Supervision of normal pregnancy    -  1       Follow-ups after your visit        Your next 10 appointments already scheduled     Nov 27, 2018  1:00 PM CST   US OB < 14 WEEKS SINGLE with PHUS1   [facility] Ultrasound ([facility])    [address] [place] [id number] [phone]           How do I prepare for my exam? (Food and drink instructions) Drink four 8-ounce glasses of fluid an hour before your exam. If you need to empty your bladder before your exam, try to release only a little urine. Then, drink another glass of fluid.  How do I prepare for my exam? (Other instructions) You may have up to two family members in the exam room. If you bring a small child, an adult must be there to care for him or her. No video or camera photography during the procedure.  What should I wear: Wear comfortable clothes.  How long does the exam take: Most ultrasounds take 30 to 60 minutes.  What should I bring: Bring a list of your medicines, including vitamins, minerals and over-the-counter drugs. It is safest to leave personal items at home.  Do I need a :  No  is needed.  What do I need to tell my doctor: Tell your doctor about any allergies you may have.  What should I do after the exam: No restrictions, You may resume normal activities.  What is this test: An ultrasound uses sound waves to make pictures of the body. Sound waves do not cause pain. The only discomfort may be the pressure of the wand against your skin or full bladder.  Who should I call with questions: If you have any questions, please call the Imaging Department where you will have your exam. Directions, parking  "instructions, and other information is available on our website, [email or web address].            Dec 17, 2018  2:10 PM CST   New Prenatal with [facility] Conrad MD   [facility]    [address] [place] [id number] [phone]              Future tests that were ordered for you today     Open Future Orders        Priority Expected Expires Ordered    US OB < 14 weeks, single,  for dating ([id number]) Routine  1/27/2019 10/29/2018            Who to contact     If you have questions or need follow up information about today's clinic visit or your schedule please contact [facility] directly at [phone].  Normal or non-critical lab and imaging results will be communicated to you by MyChart, letter or phone within 4 business days after the clinic has received the results. If you do not hear from us within 7 days, please contact the clinic through Offerumhart or phone. If you have a critical or abnormal lab result, we will notify you by phone as soon as possible.  Submit refill requests through [facility] or call your pharmacy and they will forward the refill request to us. Please allow 3 business days for your refill to be completed.          Additional Information About Your Visit        Offerumhart Information     [facility] gives you secure access to your electronic health record. If you see a primary care provider, you can also send messages to your care team and make appointments. If you have questions, please call your primary care clinic.  If you do not have a primary care provider, please call [phone] and they will assist you.        Care EveryWhere ID     This is your Care EveryWhere ID. This could be used by other organizations to access your [facility] medical records  [id number]        Your Vitals Were     Height Last Period BMI (Body Mass Index)             5' 7\" (1.702 m) 09/23/2018 (Approximate) 31.32 kg/m2          Blood Pressure from Last 3 " Encounters:   04/23/18 (!) 149/91   01/29/18 116/74   11/26/17 126/78    Weight from Last 3 Encounters:   10/29/18 200 lb (90.7 kg)   10/26/18 201 lb (91.2 kg)   01/29/18 192 lb 4 oz (87.2 kg)              We Performed the Following     ABO/Rh type and screen     CBC with platelets     Hepatitis B surface antigen     HIV Antigen Antibody Combo     Rubella Antibody IgG Quantitative     Treponema Abs w Reflex to RPR and Titer     UA reflex to Microscopic     Urine Culture Aerobic Bacterial        Primary Care Provider Office Phone # Fax #    Dereck Conrad [phone] [address] [place] [address]        Equal Access to Services     [facility] : Hadii gio michel hadasho Sokallieali, waaxda luqadaha, qaybta kaalmada adeegyada, burt churchill. So [facility] [phone].    ATENCIÓN: Si habla español, tiene a manuel disposición servicios gratuitos de asistencia lingüística. Llame al [phone].    We comply with applicable federal civil rights laws and [place] laws. We do not discriminate on the basis of race, color, national origin, age, disability, sex, sexual orientation, or gender identity.            Thank you!     Thank you for choosing [facility]  for your care. Our goal is always to provide you with excellent care. Hearing back from our patients is one way we can continue to improve our services. Please take a few minutes to complete the written survey that you may receive in the mail after your visit with us. Thank you!             Your Updated Medication List - Protect others around you: Learn how to safely use, store and throw away your medicines at www.disposemymeds.org.          This list is accurate as of 10/29/18  3:33 PM.  Always use your most recent med list.                   Brand Name Dispense Instructions for use Diagnosis    NEXIUM PO      Take 20 mg by mouth every morning (before breakfast)        order for DME     1 Units     Equipment being ordered: Breast pump and its supplies    Post-term pregnancy, 40-42 weeks of gestation, Vaginal delivery       prenatal multivitamin plus iron 27-0.8 MG Tabs per tablet      Take 1 tablet by mouth daily

## 2018-10-29 NOTE — PROGRESS NOTES
Pt here for first OB intake visit. Folder and POC discussed. All questions answered. Pt will set up appt for [facility]/S around 9 weeks and see [place] at 10-12 weeks. Pt wants to continue taking Nexium and is going to send a [employer] message to provider for approval. Pt will call with any other questions or concerns.     JUSTIN RobleroN, RN

## 2018-10-30 LAB
BACTERIA SPEC CULT: NORMAL
BACTERIA SPEC CULT: NORMAL
HBV SURFACE AG SERPL QL IA: NONREACTIVE
HIV 1+2 AB+HIV1 P24 AG SERPL QL IA: NONREACTIVE
Lab: NORMAL
SPECIMEN SOURCE: NORMAL

## 2018-10-31 ENCOUNTER — MYC MEDICAL ADVICE (OUTPATIENT)
Dept: FAMILY MEDICINE | Facility: CLINIC | Age: 28
End: 2018-10-31

## 2018-10-31 NOTE — TELEPHONE ENCOUNTER
Patient has tried all the recommended medications on the list and is asking if she is able to continue the nexium.  Her previous OB doc let her continue and she is just verifying  Please review in Dr. Conrad's Absence   Regine BERGERON

## 2018-10-31 NOTE — TELEPHONE ENCOUNTER
Zantac is most safe during pregnancy, so we can call in a Rx for 150 mg po bid for her to start.    Electronically signed by:  Darwin Rosen M.D.  10/31/2018

## 2018-11-01 LAB
RUBV IGG SERPL IA-ACNC: 8 IU/ML
T PALLIDUM AB SER QL: NONREACTIVE

## 2018-11-08 ENCOUNTER — MYC MEDICAL ADVICE (OUTPATIENT)
Dept: FAMILY MEDICINE | Facility: CLINIC | Age: 28
End: 2018-11-08

## 2018-11-08 DIAGNOSIS — N39.0 URINARY TRACT INFECTION: Primary | ICD-10-CM

## 2018-11-08 RX ORDER — CIPROFLOXACIN 250 MG/1
250 TABLET, FILM COATED ORAL 2 TIMES DAILY
Qty: 6 TABLET | Refills: 0 | Status: CANCELLED | COMMUNITY
Start: 2018-11-08

## 2018-11-08 NOTE — TELEPHONE ENCOUNTER
Per Dr. Conrad verbal order to treat patient with Cipro 250 MG BID qty 6 for 3 day no refills. Pyridium 200 MG TID qty 16 no refills. Unable to get a hold of patient no VM box is set up. Please call patient again later and see what pharmacy she would like these medications called into.   Aydee Cid MA

## 2018-11-13 RX ORDER — PHENAZOPYRIDINE HYDROCHLORIDE 200 MG/1
200 TABLET, FILM COATED ORAL 3 TIMES DAILY PRN
Qty: 16 TABLET | Refills: 0 | COMMUNITY
Start: 2018-11-08 | End: 2018-12-12

## 2018-11-13 RX ORDER — SULFAMETHOXAZOLE/TRIMETHOPRIM 800-160 MG
1 TABLET ORAL 2 TIMES DAILY
Qty: 10 TABLET | Refills: 0 | COMMUNITY
Start: 2018-11-13 | End: 2018-12-12

## 2018-11-19 ENCOUNTER — MYC MEDICAL ADVICE (OUTPATIENT)
Dept: FAMILY MEDICINE | Facility: CLINIC | Age: 28
End: 2018-11-19

## 2018-11-19 NOTE — TELEPHONE ENCOUNTER
Call placed, unable to leave message as voicemail box is full.  Will reach out via Complexahart with safe OTC medications to take during pregnancy.     Sivan Brody RN

## 2018-11-21 ENCOUNTER — MYC MEDICAL ADVICE (OUTPATIENT)
Dept: FAMILY MEDICINE | Facility: CLINIC | Age: 28
End: 2018-11-21

## 2018-11-21 DIAGNOSIS — J32.9 SINUS INFECTION: Primary | ICD-10-CM

## 2018-11-21 RX ORDER — AMOXICILLIN 500 MG/1
500 CAPSULE ORAL 2 TIMES DAILY
Qty: 20 CAPSULE | Refills: 0 | Status: SHIPPED | OUTPATIENT
Start: 2018-11-21 | End: 2019-02-15

## 2018-11-21 NOTE — TELEPHONE ENCOUNTER
VORB for Amoxicillin 500mg BID for 10 days for sinus infection per Dr. Ceja.      Reached out via phone, unable to leave message.  BlikBook message was sent for patient to contact clinic with what pharmacy script can be sent to.      Sivan Brody RN

## 2018-11-21 NOTE — TELEPHONE ENCOUNTER
Please advise in PCP's absence. Can something be prescribed, or does patient need to be seen? She is 8 weeks pregnant.

## 2018-12-04 ENCOUNTER — HOSPITAL ENCOUNTER (OUTPATIENT)
Dept: ULTRASOUND IMAGING | Facility: CLINIC | Age: 28
Discharge: HOME | End: 2018-12-04
Attending: FAMILY MEDICINE | Admitting: FAMILY MEDICINE
Payer: COMMERCIAL

## 2018-12-04 DIAGNOSIS — Z34.90 SUPERVISION OF NORMAL PREGNANCY: ICD-10-CM

## 2018-12-04 PROCEDURE — 76801 OB US < 14 WKS SINGLE FETUS: CPT

## 2018-12-12 ENCOUNTER — PRENATAL OFFICE VISIT (OUTPATIENT)
Dept: FAMILY MEDICINE | Facility: CLINIC | Age: 28
End: 2018-12-12
Payer: COMMERCIAL

## 2018-12-12 ENCOUNTER — HOSPITAL ENCOUNTER (OUTPATIENT)
Dept: ULTRASOUND IMAGING | Facility: CLINIC | Age: 28
Discharge: HOME | End: 2018-12-12
Attending: FAMILY MEDICINE | Admitting: FAMILY MEDICINE
Payer: COMMERCIAL

## 2018-12-12 VITALS
WEIGHT: 190.25 LBS | OXYGEN SATURATION: 97 % | BODY MASS INDEX: 29.8 KG/M2 | HEART RATE: 94 BPM | RESPIRATION RATE: 14 BRPM | SYSTOLIC BLOOD PRESSURE: 128 MMHG | DIASTOLIC BLOOD PRESSURE: 76 MMHG | TEMPERATURE: 98.3 F

## 2018-12-12 DIAGNOSIS — O03.9 SPONTANEOUS ABORTION: Primary | ICD-10-CM

## 2018-12-12 DIAGNOSIS — Z34.90 ENCOUNTER FOR SUPERVISION OF NORMAL PREGNANCY, ANTEPARTUM, UNSPECIFIED GRAVIDITY: ICD-10-CM

## 2018-12-12 PROCEDURE — 76801 OB US < 14 WKS SINGLE FETUS: CPT

## 2018-12-12 PROCEDURE — 99213 OFFICE O/P EST LOW 20 MIN: CPT | Performed by: FAMILY MEDICINE

## 2018-12-12 RX ORDER — HYDROCODONE BITARTRATE AND ACETAMINOPHEN 5; 325 MG/1; MG/1
1 TABLET ORAL EVERY 4 HOURS PRN
Qty: 20 TABLET | Refills: 0 | Status: SHIPPED | OUTPATIENT
Start: 2018-12-12 | End: 2019-02-15

## 2018-12-12 ASSESSMENT — PAIN SCALES - GENERAL: PAINLEVEL: NO PAIN (0)

## 2018-12-12 NOTE — PROGRESS NOTES
Chief Complaint   Patient presents with     Miscarriage       SUBJECTIVE:   Ann Cortes is a 28 year old female who presents to clinic today for the following health issues:      Chief Complaint   Patient presents with     Miscarriage             Problem list and histories reviewed & adjusted, as indicated.  Additional history: as documented  Patient is here for follow-up ultrasound today.  They did see a possible fetal pole on her last ultrasound.  I you are thinking of the [facility] strep her to come back to make sure they can see heartbeat but now the sac is collapsed and no fetal pole is visible.  She has not had any significant spotting she is starting to have some mild cramping.  She would like to complete this naturally if possible we will syndrome some pain meds.  I did talk to her about what she might expect.  Patient Active Problem List   Diagnosis     CARDIOVASCULAR SCREENING; LDL GOAL LESS THAN 160     Past Surgical History:   Procedure Laterality Date     APPENDECTOMY       GYN SURGERY  2015    Uterine polyp with D and C.     HC TOOTH EXTRACTION W/FORCEP      Age 19       Social History     Tobacco Use     Smoking status: Never Smoker     Smokeless tobacco: Never Used     Tobacco comment:    Substance Use Topics     Alcohol use: Yes     Comment: once weekly when not pregnant     Family History   Problem Relation Age of Onset     Heart Failure Paternal Grandfather      Diabetes Paternal Grandfather 89        IDDM     Coronary Artery Disease Paternal Grandfather      Cancer Paternal Grandmother         breast cancer     Breast Cancer Paternal Grandmother 72        Passed away from mets     Hypertension Mother      Hyperlipidemia Mother      Depression Sister      Anxiety Disorder Sister          Current Outpatient Medications   Medication Sig Dispense Refill     Prenatal Vit-Fe Fumarate-FA (PRENATAL MULTIVITAMIN PLUS IRON) 27-0.8 MG TABS per tablet Take 1 tablet by mouth daily        ranitidine (ZANTAC) 150 MG tablet Take 1 tablet (150 mg) by mouth 2 times daily 60 tablet 1     Esomeprazole Magnesium (NEXIUM PO) Take 20 mg by mouth every morning (before breakfast)       HYDROcodone-acetaminophen (NORCO) 5-325 MG tablet Take 1 tablet by mouth every 4 hours as needed for severe pain (Patient not taking: Reported on 12/12/2018) 20 tablet 0     order for DME Equipment being ordered: Breast pump and its supplies (Patient not taking: Reported on 10/26/2018) 1 Units 0         Patient Active Problem List   Diagnosis     CARDIOVASCULAR SCREENING; LDL GOAL LESS THAN 160     Past Surgical History:   Procedure Laterality Date     APPENDECTOMY       GYN SURGERY  2015    Uterine polyp with D and C.     HC TOOTH EXTRACTION W/FORCEP      Age 19       Social History     Tobacco Use     Smoking status: Never Smoker     Smokeless tobacco: Never Used     Tobacco comment:    Substance Use Topics     Alcohol use: Yes     Comment: once weekly when not pregnant     Family History   Problem Relation Age of Onset     Heart Failure Paternal Grandfather      Diabetes Paternal Grandfather 89        IDDM     Coronary Artery Disease Paternal Grandfather      Cancer Paternal Grandmother         breast cancer     Breast Cancer Paternal Grandmother 72        Passed away from mets     Hypertension Mother      Hyperlipidemia Mother      Depression Sister      Anxiety Disorder Sister          Current Outpatient Medications   Medication Sig Dispense Refill     Prenatal Vit-Fe Fumarate-FA (PRENATAL MULTIVITAMIN PLUS IRON) 27-0.8 MG TABS per tablet Take 1 tablet by mouth daily       ranitidine (ZANTAC) 150 MG tablet Take 1 tablet (150 mg) by mouth 2 times daily 60 tablet 1     Esomeprazole Magnesium (NEXIUM PO) Take 20 mg by mouth every morning (before breakfast)       HYDROcodone-acetaminophen (NORCO) 5-325 MG tablet Take 1 tablet by mouth every 4 hours as needed for severe pain (Patient not taking: Reported on 12/12/2018) 20  tablet 0     order for DME Equipment being ordered: Breast pump and its supplies (Patient not taking: Reported on 10/26/2018) 1 Units 0       Reviewed and updated as needed this visit by clinical staff  Tobacco  Allergies  Meds  Soc Hx      Reviewed and updated as needed this visit by Provider         ROS:  Constitutional, HEENT, cardiovascular, pulmonary, gi and gu systems are negative, except as otherwise noted.    OBJECTIVE:     /76   Pulse 94   Temp 98.3  F (36.8  C) (Tympanic)   Resp 14   Wt 86.3 kg (190 lb 4 oz)   LMP 2018 (Approximate)   SpO2 97%   BMI 29.80 kg/m    Body mass index is 29.8 kg/m .   GENERAL: healthy, alert and no distress    Diagnostic Test Results:  none     ASSESSMENT:       PLAN:   1. Spontaneous   Patient will complete this on her own.  She does know that if she has increased bleeding or significant cramping cannot control with pain meds she may need to report to the emergency room and ultimately have a D&C.  She will contact us if she thinks she completed it on her own in 2-3 weeks she has had no luck we may need to ultimately do a D&C for her we will reevaluate her at that time.  - HYDROcodone-acetaminophen (NORCO) 5-325 MG tablet; Take 1 tablet by mouth every 4 hours as needed for severe pain (Patient not taking: Reported on 2018)  Dispense: 20 tablet; Refill: 0            Dereck Conrad MD  [facility]

## 2018-12-17 ENCOUNTER — MYC MEDICAL ADVICE (OUTPATIENT)
Dept: FAMILY MEDICINE | Facility: CLINIC | Age: 28
End: 2018-12-17

## 2018-12-17 NOTE — LETTER
[address] [address]  Phone: [phone]  Fax: [phone]    December 17, 2018        Ann Cortes  [id number] [address] [place] [id number]          To whom it may concern:    RE: Ann Cortes    Please excuse her from work this week.   Patient may return to work 12/24/18 with the following:  No working or lifting restrictions. Please contact me for questions or concerns.      Sincerely,        Dereck Conrad MD

## 2018-12-17 NOTE — TELEPHONE ENCOUNTER
Per RF- ok to do note to be out this week.  Will mychart pt back. Her letter is in my pending file on my desk.  Orquidea Faye, [facility]

## 2019-02-15 ENCOUNTER — OFFICE VISIT (OUTPATIENT)
Dept: URGENT CARE | Facility: RETAIL CLINIC | Age: 29
End: 2019-02-15
Payer: COMMERCIAL

## 2019-02-15 VITALS — DIASTOLIC BLOOD PRESSURE: 92 MMHG | TEMPERATURE: 98.5 F | HEART RATE: 89 BPM | SYSTOLIC BLOOD PRESSURE: 140 MMHG

## 2019-02-15 DIAGNOSIS — H92.01 RIGHT EAR PAIN: Primary | ICD-10-CM

## 2019-02-15 PROCEDURE — 99213 OFFICE O/P EST LOW 20 MIN: CPT | Performed by: INTERNAL MEDICINE

## 2019-02-15 RX ORDER — ACETIC ACID 20.65 MG/ML
4 SOLUTION AURICULAR (OTIC) 3 TIMES DAILY
Qty: 5 ML | Refills: 0 | Status: SHIPPED | OUTPATIENT
Start: 2019-02-15 | End: 2019-02-22

## 2019-02-15 NOTE — PROGRESS NOTES
[facility] Care Progress Note        Jeff Portillo MD, MPH  02/15/2019        History:      Ann Cortes is a pleasant 28 year old year old female with a chief complaint of right ear pain x 2 days.No drainage is referred. No headache. Hx of eczema and right ear infection ( otitis externa).  No fever or chills.   No dyspnea or wheezing.   No smoking history.   No headache or neck pain.  No GI or  symptoms.   No MSK symptoms.         Assessment and Plan:          Acute right otitis externa:  - acetic acid (VOSOL) 2 % otic solution; Place 4 drops into the right ear 3 times daily for 7 days  Dispense: 5 mL; Refill: 0  Tylenol 650 mg po for pain q 6 hours prn  F/u w PCP in 4-5 days, earlier if symptoms worsen.                   Physical Exam:      BP (!) 140/92   Pulse 89   Temp 98.5  F (36.9  C) (Oral)   LMP 09/23/2018 (Approximate)      Constitutional: Patient is in no distress The patient is pleasant and cooperative.   HEENT: Head:  Head is atraumatic, normocephalic.    Eyes: Pupils are equal, round and reactive to light and accomodation.  Sclera is non-icteric. No conjunctival injection, or exudate noted. Extraocular motion is intact. Visual acuity is intact bilaterally.  Ears: Erythema,and excoriation of right external acoustic canal is noted. Mild to Moderate pain upon gentle traction on right tragus/pinna is noted. Left ear exam is normal. There is no erythema and bulging( exudate)  of bilateral tympanic membranes. No swelling or erythema of mastoid processes.  Nose: N Nasal congestion or drainage or mucosal ulceration is noted.  Throat:  Oral mucosa is moist. No oral lesions are noted. Posterior pharynx is clear.     Neck Supple.  There is no cervical lymphadenopathy.  No nuchal rigidity noted.  There is no meningismus.     Cardiovascular: Heart is regular to rate and rhythm.  No murmur is noted.     Lungs: Clear in the anterior and posterior pulmonary fields.   Abdomen: Soft and  non-tender.    Back No flank tenderness is noted.   Extremeties No edema, no calf tenderness.   Neuro: No focal deficit.   Skin No petechiae or purpura is noted.  There is no rash.   Mood Normal              Data:      All new lab and imaging data was reviewed.   Results for orders placed or performed during the hospital encounter of 18   US OB < 14 Weeks Single    Narrative    ULTRASOUND OBSTETRIC < 14 WEEKS SINGLE 2018 10:19 AM    HISTORY: Follow up ultrasound/dating. Encounter for supervision of  normal pregnancy, antepartum, unspecified .    TECHNIQUE: Transabdominal imaging was performed.      COMPARISON: OB ultrasound dated 2018.    FINDINGS:      Estimated gestational age by current ultrasound measurement: 7 weeks 0  days as determined by mean sac diameter. No fetal pole seen on today's  study. Fluid collection with thin lining could represent a gestational  sac but could also represent an irregularly enlarged yolk sac. No  fetal heartbeat is identified.    Subchorionic hemorrhage: None.    Right ovary: Unremarkable.  Left ovary: Probable corpus luteum cyst measures 2.0 x 1.7 x 1.4 cm.  Adnexal mass: None.  Free pelvic fluid: None.      Impression    IMPRESSION: No intrauterine gestation is identified. There is a  probable gestational sac without a fetal pole or significant yolk sac.  This likely represents early spontaneous intrauterine fetal demise. I  recommend follow-up with serial quantitative beta hCG values and  possibly ultrasound.    CHRISSIE DEGROOT MD

## 2019-02-26 ENCOUNTER — TELEPHONE (OUTPATIENT)
Dept: FAMILY MEDICINE | Facility: CLINIC | Age: 29
End: 2019-02-26

## 2019-02-26 DIAGNOSIS — C43.21: Primary | ICD-10-CM

## 2019-02-26 NOTE — TELEPHONE ENCOUNTER
Per Dr. Conrad, referral for ENT due to recurrent external ear infection. Referral placed and patient given information to schedule  Magui Galeas CMA

## 2019-04-16 ENCOUNTER — MYC MEDICAL ADVICE (OUTPATIENT)
Dept: FAMILY MEDICINE | Facility: CLINIC | Age: 29
End: 2019-04-16

## 2019-04-18 NOTE — TELEPHONE ENCOUNTER
I have attempted to contact this patient by phone with the following results: left message to return my call on answering machine. Will also send Attentiohart.  Please schedule this when she calls back.  Orquidea Faye, [facility]      Per RF- we can see her on Monday 04/22/19 at 1:10.

## 2019-04-19 NOTE — TELEPHONE ENCOUNTER
Patient is scheduled at 1:10 pm on 4/22/19 with Dr. Conrad at [facility].  Pau Delatorre CMA

## 2019-04-22 ENCOUNTER — OFFICE VISIT (OUTPATIENT)
Dept: FAMILY MEDICINE | Facility: CLINIC | Age: 29
End: 2019-04-22
Payer: COMMERCIAL

## 2019-04-22 VITALS
TEMPERATURE: 98 F | HEIGHT: 65 IN | DIASTOLIC BLOOD PRESSURE: 86 MMHG | WEIGHT: 199 LBS | SYSTOLIC BLOOD PRESSURE: 136 MMHG | HEART RATE: 94 BPM | OXYGEN SATURATION: 98 % | BODY MASS INDEX: 33.15 KG/M2 | RESPIRATION RATE: 14 BRPM

## 2019-04-22 DIAGNOSIS — M54.50 CHRONIC MIDLINE LOW BACK PAIN WITHOUT SCIATICA: Primary | ICD-10-CM

## 2019-04-22 DIAGNOSIS — G89.29 CHRONIC MIDLINE LOW BACK PAIN WITHOUT SCIATICA: Primary | ICD-10-CM

## 2019-04-22 PROCEDURE — 99213 OFFICE O/P EST LOW 20 MIN: CPT | Performed by: FAMILY MEDICINE

## 2019-04-22 SDOH — HEALTH STABILITY: MENTAL HEALTH: HOW OFTEN DO YOU HAVE A DRINK CONTAINING ALCOHOL?: 2-4 TIMES A MONTH

## 2019-04-22 SDOH — HEALTH STABILITY: MENTAL HEALTH: HOW MANY STANDARD DRINKS CONTAINING ALCOHOL DO YOU HAVE ON A TYPICAL DAY?: 1 OR 2

## 2019-04-22 ASSESSMENT — MIFFLIN-ST. JEOR: SCORE: 1617.19

## 2019-04-22 ASSESSMENT — PAIN SCALES - GENERAL: PAINLEVEL: MILD PAIN (3)

## 2019-04-22 NOTE — PROGRESS NOTES
SUBJECTIVE:   Ann Cortes is a 28 year old female who presents to clinic today for the following   health issues:        Chief Complaint   Patient presents with     Back Pain     x2m- no injury. Low back, more so on left side. She is having problems twisting. Some left hip pain also. Dull ache. Pain now 3/10, at its worst 7/10             Additional history: as documented  Patient is here today with complaints of midline low back pain is been off and on over the past month or so.  She has had back pain in the past but it usually is resolved now it is just kind of an aggravating pain at a level of about 2-3 when she is sitting worse sometimes when she is twisting does not get much worse when she is bending occasionally she will have some radiation to her left buttocks but for the most part it stays in her low back.  She has had history of kidney stones but the pain was much higher.  No known injury.  No numbness tingling or weakness.  No bowel or bladder dysfunction      Reviewed  and updated as needed this visit by clinical staff  Tobacco  Allergies  Meds  Soc Hx        Reviewed and updated as needed this visit by Provider         Patient Active Problem List   Diagnosis     CARDIOVASCULAR SCREENING; LDL GOAL LESS THAN 160     Past Surgical History:   Procedure Laterality Date     APPENDECTOMY       GYN SURGERY  2015    Uterine polyp with D and C.     HC TOOTH EXTRACTION W/FORCEP      Age 19       Social History     Tobacco Use     Smoking status: Never Smoker     Smokeless tobacco: Never Used     Tobacco comment:    Substance Use Topics     Alcohol use: Yes     Frequency: 2-4 times a month     Drinks per session: 1 or 2     Comment: once weekly when not pregnant     Family History   Problem Relation Age of Onset     Heart Failure Paternal Grandfather      Diabetes Paternal Grandfather 89        IDDM     Coronary Artery Disease Paternal Grandfather      Cancer Paternal Grandmother         breast  "cancer     Breast Cancer Paternal Grandmother 72        Passed away from mets     Hypertension Mother      Hyperlipidemia Mother      Depression Sister      Anxiety Disorder Sister          Current Outpatient Medications   Medication Sig Dispense Refill     Esomeprazole Magnesium (NEXIUM PO) Take 20 mg by mouth every morning (before breakfast)       Prenatal Vit-Fe Fumarate-FA (PRENATAL MULTIVITAMIN PLUS IRON) 27-0.8 MG TABS per tablet Take 1 tablet by mouth daily         ROS:  Constitutional, HEENT, cardiovascular, pulmonary, gi and gu systems are negative, except as otherwise noted.    OBJECTIVE:     /86   Pulse 94   Temp 98  F (36.7  C) (Tympanic)   Resp 14   Ht 1.641 m (5' 4.6\")   Wt 90.3 kg (199 lb)   LMP 09/22/2018 (Exact Date)   SpO2 98%   Breastfeeding? No   BMI 33.53 kg/m    Body mass index is 33.53 kg/m .   GENERAL: healthy, alert and no distress  Back: Patient has full forward flexion of the lumbar spine full extension and full rotation of the lumbar spine without discomfort.  No tenderness to palpation along the paraspinal musculature in the lumbar region or over the lumbar vertebrae.  Muscle strength was equal and appropriate.  Neuro exam normal reflexes 2+ and symmetrical    Diagnostic Test Results:  none     ASSESSMENT:       PLAN:   1. Chronic midline low back pain without sciatica  I did give the patient some back exercises which I would like her to do for the next 2 to 3 weeks.  Did start her on ibuprofen 600 mg 3 times daily for the next 2 weeks.  She has persistent problems I would like her to see her chiropractor in the specialty clinic.  She will contact me we can arrange his appointment for her if the symptoms resolve she can follow-up on a as needed basis continue to do the back exercises to hopefully prevent future issues.              Dereck Conrad MD  [facility]        "

## 2020-03-11 ENCOUNTER — HEALTH MAINTENANCE LETTER (OUTPATIENT)
Age: 30
End: 2020-03-11

## 2020-04-07 ENCOUNTER — TRANSFERRED RECORDS (OUTPATIENT)
Dept: HEALTH INFORMATION MANAGEMENT | Facility: CLINIC | Age: 30
End: 2020-04-07

## 2020-07-05 ENCOUNTER — TRANSFERRED RECORDS (OUTPATIENT)
Dept: HEALTH INFORMATION MANAGEMENT | Facility: CLINIC | Age: 30
End: 2020-07-05

## 2020-07-06 ENCOUNTER — TRANSFERRED RECORDS (OUTPATIENT)
Dept: HEALTH INFORMATION MANAGEMENT | Facility: CLINIC | Age: 30
End: 2020-07-06

## 2020-07-06 LAB
ALT SERPL-CCNC: 44 U/L (ref 8–45)
AST SERPL-CCNC: 25 U/L (ref 5–41)
CREAT SERPL-MCNC: 0.97 MG/DL (ref 0.57–1.11)
GFR SERPL CREATININE-BSD FRML MDRD: >60 ML/MIN/1.73M(2)
GLUCOSE SERPL-MCNC: 108 MG/DL (ref 70–100)
POTASSIUM SERPL-SCNC: 3.9 MMOL/L (ref 3.5–5.1)

## 2020-12-27 ENCOUNTER — HEALTH MAINTENANCE LETTER (OUTPATIENT)
Age: 30
End: 2020-12-27

## 2021-04-25 ENCOUNTER — HEALTH MAINTENANCE LETTER (OUTPATIENT)
Age: 31
End: 2021-04-25

## 2021-10-09 ENCOUNTER — HEALTH MAINTENANCE LETTER (OUTPATIENT)
Age: 31
End: 2021-10-09

## 2022-05-21 ENCOUNTER — HEALTH MAINTENANCE LETTER (OUTPATIENT)
Age: 32
End: 2022-05-21

## 2022-09-17 ENCOUNTER — HEALTH MAINTENANCE LETTER (OUTPATIENT)
Age: 32
End: 2022-09-17

## 2023-06-04 ENCOUNTER — HEALTH MAINTENANCE LETTER (OUTPATIENT)
Age: 33
End: 2023-06-04

## 2025-04-04 NOTE — TELEPHONE ENCOUNTER
Pt called and states she received azelaic acid was wondering if it states anywhere on how to apply the medication. RN notified pt, per Dr. Terry Greenberg orders and last visit note, azelaic acid should be applied to the face once daily and did not mention to only apply to a specific area. Pt verbalized understanding and has no further questions or concerns.    Chanda EDWARDSN RN    Tried to call patient and the mailbox is full and can not leave a message at this time.  MP/MA